# Patient Record
Sex: MALE | Race: WHITE | NOT HISPANIC OR LATINO | Employment: OTHER | URBAN - METROPOLITAN AREA
[De-identification: names, ages, dates, MRNs, and addresses within clinical notes are randomized per-mention and may not be internally consistent; named-entity substitution may affect disease eponyms.]

---

## 2019-06-22 ENCOUNTER — HOSPITAL ENCOUNTER (OUTPATIENT)
Dept: RADIOLOGY | Facility: MEDICAL CENTER | Age: 74
End: 2019-06-22
Attending: ORTHOPAEDIC SURGERY | Admitting: ORTHOPAEDIC SURGERY
Payer: MEDICARE

## 2019-06-22 DIAGNOSIS — R20.0 NUMBNESS: ICD-10-CM

## 2019-06-22 PROCEDURE — 72141 MRI NECK SPINE W/O DYE: CPT

## 2019-07-17 ENCOUNTER — NON-PROVIDER VISIT (OUTPATIENT)
Dept: NEUROLOGY | Facility: MEDICAL CENTER | Age: 74
End: 2019-07-17
Payer: MEDICARE

## 2019-07-17 ENCOUNTER — OFFICE VISIT (OUTPATIENT)
Dept: NEUROLOGY | Facility: MEDICAL CENTER | Age: 74
End: 2019-07-17
Payer: MEDICARE

## 2019-07-17 VITALS
HEIGHT: 69 IN | BODY MASS INDEX: 24.59 KG/M2 | TEMPERATURE: 97.7 F | RESPIRATION RATE: 18 BRPM | OXYGEN SATURATION: 94 % | WEIGHT: 166 LBS | DIASTOLIC BLOOD PRESSURE: 80 MMHG | HEART RATE: 68 BPM | SYSTOLIC BLOOD PRESSURE: 128 MMHG

## 2019-07-17 DIAGNOSIS — S54.01XA DAMAGE TO RIGHT ULNAR NERVE, INITIAL ENCOUNTER: ICD-10-CM

## 2019-07-17 DIAGNOSIS — G61.81 CIDP (CHRONIC INFLAMMATORY DEMYELINATING POLYNEUROPATHY) (HCC): ICD-10-CM

## 2019-07-17 DIAGNOSIS — R20.2 PARESTHESIA: ICD-10-CM

## 2019-07-17 DIAGNOSIS — R73.09 ALTERED GLUCOSE METABOLISM: ICD-10-CM

## 2019-07-17 DIAGNOSIS — G62.9 PERIPHERAL POLYNEUROPATHY: ICD-10-CM

## 2019-07-17 DIAGNOSIS — G56.01 CARPAL TUNNEL SYNDROME OF RIGHT WRIST: ICD-10-CM

## 2019-07-17 DIAGNOSIS — R53.1 WEAKNESS: ICD-10-CM

## 2019-07-17 DIAGNOSIS — E55.9 VITAMIN D DEFICIENCY: ICD-10-CM

## 2019-07-17 PROCEDURE — 99205 OFFICE O/P NEW HI 60 MIN: CPT | Performed by: PSYCHIATRY & NEUROLOGY

## 2019-07-17 PROCEDURE — 95910 NRV CNDJ TEST 7-8 STUDIES: CPT | Performed by: PSYCHIATRY & NEUROLOGY

## 2019-07-17 PROCEDURE — 95886 MUSC TEST DONE W/N TEST COMP: CPT | Performed by: PSYCHIATRY & NEUROLOGY

## 2019-07-17 ASSESSMENT — PATIENT HEALTH QUESTIONNAIRE - PHQ9: CLINICAL INTERPRETATION OF PHQ2 SCORE: 0

## 2019-07-17 NOTE — NON-PROVIDER
NEUROLOGY INITIAL NOTE    Referring Physician  Dr. Chopra  Chief Complaint:  Peripheral Neuropathy    History of Present Illness:   Rene was referred to us by orthopedics for an EMG due to new onset peripheral neuropathy in all extremities for the past 6 weeks. Patient received EMG today for evaluation which was abnormal. Patient was seen as a result.  The patient initially was diagnosed with right carpal tunnel in 2008 and was treated with carpal tunnel release with minimal improvement. The patient was again seen in 2013 for progressive atrophy of the right hand and was diagnosed with carpal tunnel and ulnar nerve compression. The patient underwent a [place] release, right cubital tunnel release and right carpal tunnel release. Patient reported minor improvement in symptoms but is still experiencing atrophy, pain, paraesthesia and loss of motor function with the right hand.   6 weeks ago he began to experience tingling in his left hand, a feeling of cold in his feet bilaterally and fatigue that was episodic in nature. The patient reports each episode would last approximately 2-3 hours per day and was random, without aggravation. Nothing made the symptoms worse or better. Patient denies pain, spasticity, falls, ataxia or diminished strength with the episodes. Patient denies any associated back or neck pain.   [facility] as pre-diabetic in 06/2017 which was treated with diet and exercise. Patient reports good compliance with diet / exercise. Patient received a B12 shot during his visit but was told his cholesterol and the rest of his routine labs were normal. Patient has not followed with primary care since. Patient has no history of stroke, heart attack, cancer, or vascular disease. Patient has never been exposed to toxins, diagnosed with Lyme disease or Syphilis.       Medical History:  Median and Ulnar nerve compression  Scaphoid fracture ~35 years ago    Family  History:  Sister with Type 2 diabetes, but reports she has unhealthy eating practices. Dad  of MI - reported poor diet as well    Social History:  Tobacco use: None  Alcohol use: None    ROS:  Constitutional: Denies fevers, Denies weight changes   Eyes: Denies changes in vision, no eye pain   Ears/Nose/Throat/Mouth: Denies nasal congestion or sore throat   Cardiovascular: Denies chest pain or palpitations   Respiratory: Denies SOB.   Gastrointestinal/Hepatic: Denies abdominal pain, nausea, vomiting, diarrhea, constipation or GI bleeding   Genitourinary: Denies bladder dysfunction, dysuria or frequency   Musculoskeletal/Rheum: Denies joint pain and swelling   Skin/Breast: Denies rash, denies breast lumps or discharge   Neurological: Denies headache, confusion, memory loss or focal weakness/parasthesias   Psychiatric: denies mood disorder   Endocrine: denies hx of diabetes or thyroid dysfunction   Heme/Oncology/Lymph Nodes: Denies enlarged lymph nodes, denies brusing or known bleeding disorder   Allergic/Immunologic: Denies hx of allergies   All other systems were reviewed and are negative (AMA/CMS criteria)       Neurological Exam:  Orientation to time, place, and person were normal  Recent and remote memory were normal  Attention span and concentration were normal  Language (naming, repetition, spontaneous speech) was normal  Fund of knowledge was normal    Cranial nerves were normal    Motor: (tone, bulk and strength): Low bulk, tone and strength in right thenar, hypothenar and intrinsic hand muscles.    Sensation (all modalities) was normal    Reflexes were normal and symmetrical in both upper extremities.Patient has normal quadriceps reflex bilaterally. Ankle jerk reflexes were absent    Coordination (finger-to-nose, heel/knee/shin, rapid alternating movements) was normal. There was no ataxia, no tremors, and no dysmetria. Station and gait were normal. Mild swaying during romberg test         NEUROIMAGING:      EEG:     Medications:  Current Outpatient Prescriptions   Medication   • aspirin EC (ECOTRIN) 81 MG Tablet Delayed Response   • ondansetron (ZOFRAN) 4 MG TABS   • Garlic TABS     No current facility-administered medications for this visit.        There were no vitals taken for this visit.      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       EMG showed temporal dispersion and low amplitude in Median nerve, proximal to wrist bilaterally. Anterior Tibialis showed temporal dispersion and low amplitude bilaterally. Common peroneal nerve showed temporal dispersion and low amplitude bilaterally  @CMP@    Patient Active Problem List    Diagnosis Date Noted   • Lesion of ulnar nerve 10/25/2013   • Carpal tunnel syndrome 10/11/2013         Diagnosis: EMG results and episodic nature of patients symptoms strongly suggest CIDP as potential cause. Vitamin B12, E and folate deficiency as well as poorly controlled diabetes are also potential causes. Paraneoplastic, untreated infections, and toxins are also possible etiologies.   Will need confirmatory testing before making final diagnosis    Plan/Recommendations:    Patient will be sent for blood work and LP in the next 4 weeks. Will order CBC / CMP / TSH / paraneoplastic panel / Vitamin panel / glucose tolerance test to rule out other causes of peripheral neuropathy.  Counseled patient on risks associated with LP and need for test, patient agreed to receive test. Counseled patient on glucose tolerance test.  Counseled patient on potential causes of his symptoms, including vitamin deficiency, toxins, infection and auto immune, including CIDP.   Will follow up with patient in 5 weeks to discuss results of testing and potential treatment plans pending test results  Counseled patient if his symptoms severely worsen or if he experiences ataxia, decreased motor function, or severe sensory loss to go to an ER.      Patient understood and agreed to plan      Frandy Cruz MD   Epilepsy and Neurodiagnostics.   Clinical  of Neurology [facility].   Diplomate in Neurology, Epilepsy, and Electrodiagnostic Medicine.   Office: [phone]  Fax: [phone]

## 2019-07-17 NOTE — PROCEDURES
NERVE CONDUCTION STUDIES AND ELECTROMYOGRAPHY REPORT        Date of service: July 17, 2019    Referring provider: Dr. Chopra.    SUMMARY OF PATIENT'S CLINICAL HISTORY, AND RATIONALE FOR TESTING:    Mr. Lei Isaacs 73 y.o. presenting with with history of right carpal tunnel, status post release twice, history of right ulnar neuropathy, status post transposition.  Progressive weakness of the right hand.  Paresthesias in both hands and both feet for at least 2-month.  Study done for possible polyneuropathy.    Past Medical History is significant for :   Past Medical History:   Diagnosis Date   • Arrhythmia     palpitations, increased heart beat on occasion          ELECTRODIAGNOSTIC EXAMINATION:  Nerve conduction studies (NCS) and electromyography (EMG) are utilized to evaluate direct or indirect damage to the peripheral nervous system. NCS are performed to measure the nerve(s) response(s) to electrostimulation across a given nerve segment. EMG evaluates the passive and active electrical activity of the muscle(s) in question.  Muscles are innervated by specific peripheral nerves and roots. Often times, several nerves the muscle to be examined in order to determine the presence or absence of the disease process. Furthermore, nerves and muscles may need to be tested in a [id number] comparison, as well as in additional extremities, as this may be crucial in characterizing the extent of the disease process, which may be diffuse or isolated and of varying degree of severity. The extent of the neurodiagnostic exam is justified as it may help arrive to a proper diagnosis, which ultimately may contribute to better management of the patient. Therefore, the nerves to muscles examined during the study were medically necessary.    Unless otherwise noted, temperature of the extremity(s) study was monitored before and during the examination and remained between 32 and 36 degrees C for the upper extremities, and between  30 and 36 degrees C for the lower extremities.      NERVE CONDUCTION STUDIES:  Sensory nerves:   -Right median sensory nerve was examined.there was moderately decreased sensory nerve action potential amplitude.  There was moderate to severely decreased conduction velocity.  -Right ulnar sensory nerve was examined.  No response could be obtained.  -Right sural nerve was tested.  No response could be obtained.    Motor nerves:   - Bilateral Median motor nerves were examined. Recording electrodes placed at the Abductor Pollicis Brevis muscles.  Severely decreased conduction velocity in both nerves.  There was conduction block with temporal dispersion in both nerves.  In addition the right median motor nerve exhibited a moderate prolongation of distal latency, and moderate decrease in the compound motor action potential amplitude.  -Right ulnar motor nerve was examined. Recording electrode placed at the Abductor Digiti Minimi muscle.  There was a severe prolongation of the distal latency.  The compound motor action potential amplitude was severely decreased.  The conduction velocity was severely decreased.  -Right tibial nerve was examined. Recording electrode placed at the Abductor Hallucis muscle.  There was mildly prolonged distal latency, and moderately decreased conduction velocity.  There was a conduction block with temporal dispersion when the nerve was evaluated at the popliteal fossa.  -Right deep Peroneal motor nerve was examined. Recording electrode was placed at the Extensor Digitorum Brevis muscle.there was severely prolonged distal latency.  The compound motor action potential amplitude was severely decreased.    LATE RESPONSES:  F waves were assessed in the following nerves: Left median, right median, right peroneal, right tibial, right ulnar.  The responses were abnormal: The left median response was severely prolonged, the right median response was absent.  The right tibial response was severely  prolonged.  The right ulnar and right peroneal's were not present.    ELECTROMYOGRAPHY:  The study was performed the concentric needle electrode. Fibrillation and fasciculation activity is graded by convention from none (0) to continuous (4+).  Needle electrode examination was performed in the following muscles: Right low cervical paraspinals, right deltoid, right biceps, right triceps, right abductor pollicis brevis, right abductor digit he minimi, right upper lumbar paraspinals, right vastus lateralis, right tibialis anterior, right medial gastrocnemius, right extensor digitorum brevis, and right abductor hallucis muscle.  There was increased insertional activity in the right triceps, right tibialis anterior, and the right medial gastrocnemius. There was atrophy in the right abductor pollicis brevis, abductor digit he minimi, extensor digitorum brevis, abductor hallucis.  There were isolated fasciculations in the right biceps, right medial gastrocnemius, and the right abductor pollicis brevis.  There were signs of active denervation in the right tibialis anterior, and right medial gastrocnemius.  There was reduced recruitment in the right abductor pollicis brevis, right tibialis anterior, right medial gastrocnemius, right extensor digitorum brevis, and right abductor hallucis.  Chronic motor units were seen in the right abductor pollicis brevis, right abductor digit he minimi, right tibialis anterior, right medial gastrocnemius, right abductor hallucis.  Chronic repetitive discharges were seen in the right medial gastrocnemius.  The cervical and lumbar paraspinals were normal.    The patient tolerated testing well, without any complications.       Nerve Conduction Studies     Stim Site NR Onset (ms) Norm Onset (ms) O-P Amp (µV) Norm O-P Amp Site1 Site2 Delta-P (ms) Dist (cm) Angel (m/s) Norm Angel (m/s)   Right Median Anti Sensory (2nd Digit)   Wrist    2.9 <3.8 *7.6 >10 Wrist 2nd Digit 4.2 14.0 *33 >50   Right Sural  Anti Sensory (Lat Mall)  35.4°C   Calf *NR  <4.6  >3 Calf Lat Mall  14.0  >40   Right Ulnar Anti Sensory (5th Digit)   Wrist *NR  <3.2  >5 Wrist 5th Digit  14.0  >50        Stim Site NR Onset (ms) Norm Onset (ms) O-P Amp (mV) Norm O-P Amp Site1 Site2 Delta-0 (ms) Dist (cm) Angel (m/s) Norm Angel (m/s)   Left Median Motor (Abd Poll Brev)  35.4°C   Wrist    3.8 <4 8.7 >5 Elbow Wrist 10.0 25.0 *25 >50   Elbow    13.8  3.1          Right Median Motor (Abd Poll Brev)   Wrist    *4.8 <4 *3.1 >5 Elbow Wrist 17.7 26.0 *15 >50   Elbow    22.5  0.5          Right Peroneal EDB Motor (Ext Dig Brev)   Ankle    *9.0 <6 *0.4 >2.5 B Fib Ankle 8.9 36.0 40 >40   B Fib    17.9  0.4  Poplt B Fib 2.9 10.0 34    Poplt    20.8  0.5          Right Tibial Motor (Abd Valdez Brev)   Ankle    *6.5 <6 8.2 >4 Knee Ankle 11.3 41.0 *36 >40   Knee    17.8  2.2          Right Ulnar Motor (Abd Dig Min)   Wrist    *5.3 <3.1 *2.5 >7 B Elbow Wrist 10.6 23.0 *22 >50   B Elbow    15.9  2.2  A Elbow B Elbow 2.8 10.0 36    A Elbow    18.7  2.1            F Wave Studies     NR F-Lat (ms) Lat Norm (ms)   Left Median (Abd Poll Brev)  35.4°C      *39.28 <31   Right Median (Abd Poll Brev)      *32.77 <31   Right Peroneal EDB (Ext Dig Brev)   *NR  <57   Right Tibial (Abd Hallucis)      *74.74 <57   Right Ulnar (Abd Dig Min)      *32.94 <32                                      Electromyography     Side Muscle Nerve Root Ins Act Fibs Psw Amp Dur Poly Recrt Int Pat Comment   Right Deltoid Axillary C5-6 Nml Nml Nml Nml Nml 0 Nml Nml    Right Biceps Musculocut C5-6 Nml Nml Nml Nml Nml 0 Nml Nml fasciculation   Right Triceps Radial C6-7-8 *Incr Nml Nml Nml Nml 0 Nml Nml    Right Abd Poll Brev Median C8-T1 Nml Nml Nml *Incr Nml 0 *Reduced Nml fasciculation, atrophy   Right Abd Dig Min Ulnar C8-T1 Nml Nml Nml *Incr Nml 0 Nml Nml atrophy   Right VastusLat Femoral L2-4 Nml Nml Nml Nml Nml 0 Nml Nml    Right AntTibialis Dp Br Fibular L4-5 Nml *1+ Nml *Incr Nml 0 *Reduced Nml     Right Gastroc Tibial S1-2 Nml *1+ Nml *Incr Nml 0 *Reduced Nml fasciculation, CRD   Right Ext Dig Brev Dp Br Fibular L5, S1 Nml Nml Nml Nml Nml 0 *Reduced Nml atrophy   Right AbdHallucis MedPlantar S1-2 Nml Nml Nml *Incr Nml 0 *Reduced Nml atrophy   Right Lumbo Parasp Up Rami L1-2 Nml Nml Nml         Right Cervical Parasp Low Rami C7-8 Nml Nml Nml                 DIAGNOSTIC INTERPRETATION:   Extensive electrodiagnostic studies were performed to the right upper and right lower extremities, and to a limited degree, for comparison purposes, to the left upper extremity. The studies were abnormal.       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     The patient has a history of right Carpal Tunnel Syndrome and right Ulnar Neuropathy at the elbow, however in the presence of the widespread abnormalities found on today's testing, we could not make statements about these conditions at this time.       Frandy Cruz MD  Medical Director, Epilepsy and Neurodiagnostics.   Clinical  of Neurology [facility].   Diplomate in Neurology, Epilepsy, and Electrodiagnostic Medicine.   Office: [phone]  Fax: [phone]

## 2019-07-18 NOTE — PROGRESS NOTES
Chief Complaint   Patient presents with   • New Patient     EMG results       Problem List Items Addressed This Visit     Carpal tunnel syndrome    Relevant Orders    ANGIOTENSIN I CONVERTING ENZYME    CRP HIGH SENSITIVE    FOLATE    CRYOGLOBULIN QL SERUM RFLX    HEAVY METALS BLOOD    GLUCOSE TOLERANCE 2 HOUR    HEMOGLOBIN A1C    PARANEOPLASTIC SYNDROME AB PANEL    RPR (SYPHILIS)    SJOGREN'S AB, ANTI-SS-A/-SS-B    SPEP W/REFLEX TO JOAQUÍN, A, G, M    TSH    VITAMIN B1    VITAMIN B12    VITAMIN B6    VITAMIN D,25 HYDROXY    VITAMIN E    WESTERGREN SED RATE    ZINC SERUM    PT AND PTT    Prothrombin Time    CBC WITH DIFFERENTIAL    Comp Metabolic Panel      Other Visit Diagnoses     CIDP (chronic inflammatory demyelinating polyneuropathy) ([facility])        Relevant Orders    ANGIOTENSIN I CONVERTING ENZYME    CRP HIGH SENSITIVE    FOLATE    CRYOGLOBULIN QL SERUM RFLX    HEAVY METALS BLOOD    GLUCOSE TOLERANCE 2 HOUR    HEMOGLOBIN A1C    PARANEOPLASTIC SYNDROME AB PANEL    RPR (SYPHILIS)    SJOGREN'S AB, ANTI-SS-A/-SS-B    SPEP W/REFLEX TO JOAQUÍN, A, G, M    TSH    VITAMIN B1    VITAMIN B12    VITAMIN B6    VITAMIN D,25 HYDROXY    VITAMIN E    WESTERGREN SED RATE    ZINC SERUM    PT AND PTT    Prothrombin Time    DX-LUMBAR PUNCTURE FOR DIAGNOSIS    CBC WITH DIFFERENTIAL    Comp Metabolic Panel    CSF CELL COUNT    CSF PROTEIN    TREPONEMA PALLIDUM ANTIBODY, IGG BY IFA (CSF)    ACE, CSF    IGG, CSF INDEX    LYME (B. BURGDORFERI) PCR    Damage to right ulnar nerve, initial encounter        Relevant Orders    ANGIOTENSIN I CONVERTING ENZYME    CRP HIGH SENSITIVE    FOLATE    CRYOGLOBULIN QL SERUM RFLX    HEAVY METALS BLOOD    GLUCOSE TOLERANCE 2 HOUR    HEMOGLOBIN A1C    PARANEOPLASTIC SYNDROME AB PANEL    RPR (SYPHILIS)    SJOGREN'S AB, ANTI-SS-A/-SS-B    SPEP W/REFLEX TO JOAQUÍN, A, G, M    TSH    VITAMIN B1    VITAMIN B12    VITAMIN B6    VITAMIN D,25 HYDROXY    VITAMIN E    WESTERGREN SED RATE    ZINC SERUM    PT AND PTT     Prothrombin Time    CBC WITH DIFFERENTIAL    Comp Metabolic Panel    Altered glucose metabolism        Relevant Orders    ANGIOTENSIN I CONVERTING ENZYME    CRP HIGH SENSITIVE    FOLATE    CRYOGLOBULIN QL SERUM RFLX    HEAVY METALS BLOOD    GLUCOSE TOLERANCE 2 HOUR    HEMOGLOBIN A1C    PARANEOPLASTIC SYNDROME AB PANEL    RPR (SYPHILIS)    SJOGREN'S AB, ANTI-SS-A/-SS-B    SPEP W/REFLEX TO JOAQUÍN, A, G, M    TSH    VITAMIN B1    VITAMIN B12    VITAMIN B6    VITAMIN D,25 HYDROXY    VITAMIN E    WESTERGREN SED RATE    ZINC SERUM    PT AND PTT    Prothrombin Time    CBC WITH DIFFERENTIAL    Comp Metabolic Panel    Vitamin D deficiency        Relevant Orders    ANGIOTENSIN I CONVERTING ENZYME    CRP HIGH SENSITIVE    FOLATE    CRYOGLOBULIN QL SERUM RFLX    HEAVY METALS BLOOD    GLUCOSE TOLERANCE 2 HOUR    HEMOGLOBIN A1C    PARANEOPLASTIC SYNDROME AB PANEL    RPR (SYPHILIS)    SJOGREN'S AB, ANTI-SS-A/-SS-B    SPEP W/REFLEX TO JOAQUÍN, A, G, M    TSH    VITAMIN B1    VITAMIN B12    VITAMIN B6    VITAMIN D,25 HYDROXY    VITAMIN E    WESTERGREN SED RATE    ZINC SERUM    PT AND PTT    Prothrombin Time    CBC WITH DIFFERENTIAL    Comp Metabolic Panel    Weakness        Relevant Orders    Prothrombin Time    DX-LUMBAR PUNCTURE FOR DIAGNOSIS    CBC WITH DIFFERENTIAL    Comp Metabolic Panel    LYME (B. BURGDORFERI) PCR    Paresthesia              History of present illness:  Lei Isaacs 73 y.o. male presents today referred to us by orthopedics for an EMG due to new onset peripheral neuropathy in all extremities for the past 6-8 weeks. Patient underwent EMG today for evaluation which was abnormal, and the patient needed to be seen relatively soon, due to the abnormality of the results.  The patient initially was diagnosed with right carpal tunnel in 2008 and was treated with carpal tunnel release with minimal improvement. The patient was again seen in 2013 for progressive atrophy of the right hand and was diagnosed with carpal  tunnel and ulnar nerve compression. The patient underwent a [place] release, right cubital tunnel release and right carpal tunnel release. Patient reported minor improvement in symptoms but is still experiencing atrophy, pain, paraesthesia and loss of motor function with the right hand.   6 to 8 weeks ago he began to experience tingling in both hands, a feeling of cold in his feet bilaterally and fatigue that was episodic in nature. The patient reports each episode would last approximately 2-3 hours per day and was random, without aggravation. Nothing made the symptoms worse or better. Patient denies pain, spasticity, falls, ataxia or diminished strength with the episodes. Patient denies any associated back or neck pain.   [facility] as pre[facility]diabetic in 06/2017 which was treated with diet and exercise. Patient reports good compliance with diet / exercise. Patient received a B12 shot during his visit but was told his cholesterol and the rest of his routine labs were normal. Patient has not followed with primary care since. Patient has no history of stroke, heart attack, cancer, or vascular disease. Patient has never been exposed to toxins, diagnosed with Lyme disease or Syphilis.     About 2 years ago, he had shingles in the thoracic region on the left, and developed postherpetic neuralgia.  Other than that, he has not had any recent surgeries, vaccinations and or infections in the last 2 years, that he remembers.    Past medical history:   Past Medical History:   Diagnosis Date   • Arrhythmia     palpitations, increased heart beat on occasion       Past surgical history:   Past Surgical History:   Procedure Laterality Date   • CARPAL TUNNEL RELEASE  10/25/2013    Performed by Lauro Rossi M.D. at SUR[facility]ERY [facility] DAY [facility]   • NERVE ULNAR TRANSFER  10/25/2013    Performed by Lauro Rossi M.D. at [facility]ERY SAME DAY [facility]   • EPICONDYLAR STRIPPING   10/25/2013    Performed by Lauro Rossi M.D. at [facility]ERY SAME DAY [facility]   • CARPAL TUNNEL RELEASE      right       Family history:   No family history on file.    Social history:   Social History     Social History   • Marital status: Single     Spouse name: N/A   • Number of children: N/A   • Years of education: N/A     Occupational History   • Not on file.     Social History Main Topics   • Smoking status: Never Smoker   • Smokeless tobacco: Never Used   • Alcohol use Yes      Comment: 1 beer a day occassionally   • Drug use: No   • Sexual activity: Not on file     Other Topics Concern   • Not on file     Social History Narrative   • No narrative on file       Current medications:   Current Outpatient Prescriptions   Medication   • aspirin EC (ECOTRIN) 81 MG Tablet Delayed Response   • ondansetron (ZOFRAN) 4 MG TABS   • Garlic TABS     No current facility-administered medications for this visit.        Medication Allergy:  No Known Allergies    Review of systems:   Constitutional: denies fever, night sweats, weight loss.   Eyes: denies acute vision change, eye pain or secretion.   Ears, Nose, Mouth, Throat: denies nasal secretion, nasal bleeding, difficulty swallowing, hearing loss, tinnitus, vertigo, ear pain, acute dental problems, oral ulcers or lesions.   Endocrine: denies recent weight changes, heat or cold intolerance, polyuria, polydypsia, polyphagia,abnormal hair growth.  Cardiovascular: denies new onset of chest pain, palpitations, syncope, or dyspnea of exertion.  Pulmonary: denies shortness of breath, new onset of cough, hemoptysis, wheezing, chest pain or flu-like symptoms.   GI: denies nausea, vomiting, diarrhea, GI bleeding, change in appetite, abdominal pain, and change in bowel habits.  : denies dysuria, urinary incontinence, hematuria.  Heme/oncology: denies history of easy bruising or bleeding. No history of cancer, DVTor PE.  Allergy/immunology: denies hives/urticaria, or  "itching.   Dermatologic: denies new rash, or new skin lesions.  Musculoskeletal:denies joint swelling or pain, muscle pain, neck and back pain. Neurologic: denies headaches, acute visual changes, facial droopiness, anesthesia, ataxia, change in speech or language, memory loss, abnormal movements, seizures, loss of consciousness, or episodes of confusion.   Psychiatric: denies symptoms of depression, anxiety, hallucinations, mood swings or changes, suicidal or homicidal thoughts.     Physical examination:   Vitals:    07/17/19 1518   BP: 128/80   BP Location: Right arm   Patient Position: Sitting   BP Cuff Size: Adult   Pulse: 68   Resp: 18   Temp: 36.5 °C (97.7 °F)   TempSrc: Temporal   SpO2: 94%   Weight: 75.3 kg (166 lb)   Height: 1.753 m (5' 9\")     General: Patient in no acute distress, pleasant and cooperative.  HEENT: Normocephalic, no signs of acute trauma.   Neck: supple, no meningeal signs or carotid bruits. There is normal range of motion. No tenderness on exam.   Chest: clear to auscultation. No cough.   CV: RRR, no murmurs.   Skin: no signs of acute rashes or trauma.   Musculoskeletal: joints exhibit full range of motion, without any pain to palpation. There are no signs of joint or muscle swelling. There is no tenderness to deep palpation of muscles.   Psychiatric: No hallucinatory behavior. Denies symptoms of depression or suicidal ideation. Mood and affect appear normal on exam.     NEUROLOGICAL EXAM:   Mental status, orientation: Awake, alert and fully oriented.   Speech and language: speech is clear and fluent. The patient is able to name, repeat and comprehend.   Memory: There is intact recollection of recent and remote events.   Cranial nerve exam: Pupils are 3-4 mm bilaterally and equally reactive to light and accommodation. Visual fields are intact by confrontation. There is no nystagmus on primary or secondary gaze. Intact full EOM in all directions of gaze. Face appears symmetric. Sensation in " the face is intact to light touch. Uvula is midline. Palate elevates symmetrically. Tongue is midline and without any signs of tongue biting or fasciculations. Sternocleidomastoid muscles exhibit is normal strength bilaterally. Shoulder shrug is intact bilaterally.   Motor exam: Strength is 5/5 in all extremities, there is atrophy in the thenar and hyperthenar regions of the right hand, with diminished strength to 4- out of 5. Tone is normal. No abnormal movements were seen on exam.   Sensory exam reveals normal sense of light touch in all extremities.   Deep tendon reflexes:  2+ throughout, with absent ankle jerks. Plantar responses are flexor. There is no clonus.   Coordination: shows a normal finger-nose-finger. Normal rapidly alternating movements.   Gait: The patient was able to get up from seated position on first attempt without requiring assistance. Found to be steady when walking. Movements were fluid with normal arm swing. The patient was able to turn without difficulties or tendency to fall. Romberg examination shows mild swaying in all directions.      ANCILLARY DATA REVIEWED:     Lab Data Review:  Reviewed in chart.      Records reviewed:   Chart reviewed.      Imaging:   MRI cervical spine without contrast, 6/22/2019:  Multilevel degenerative changes of the cervical spine as described above.  (I personally reviewed images with the patient, multilevel degeneration, no cord compression or signal abnormality).      Nerve conduction study and EMG, 7/17/2019:  DIAGNOSTIC INTERPRETATION:   Extensive electrodiagnostic studies were performed to the right upper and right lower extremities, and to a limited degree, for comparison purposes, to the left upper extremity. The studies were abnormal.    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            ASSESSMENT AND PLAN:    1. CIDP (chronic inflammatory demyelinating polyneuropathy) (HCC)  - ANGIOTENSIN I CONVERTING ENZYME; Future  - CRP HIGH SENSITIVE  - FOLATE; Future  - CRYOGLOBULIN QL SERUM RFLX; Future  - HEAVY METALS BLOOD; Future  - GLUCOSE TOLERANCE 2 HOUR; Future  - HEMOGLOBIN A1C; Future  - PARANEOPLASTIC SYNDROME AB PANEL; Future  - RPR (SYPHILIS); Future  - SJOGREN'S AB, ANTI-SS-A/-SS-B  - SPEP W/REFLEX TO JOAQUÍN, A, G, M; Future  - TSH; Future  - VITAMIN B1; Future  - VITAMIN B12; Future  - VITAMIN B6; Future  - VITAMIN D,25 HYDROXY; Future  - VITAMIN E; Future  - WESTERGREN SED RATE; Future  - ZINC SERUM; Future  - PT AND PTT  - Prothrombin Time; Future  - DX-LUMBAR PUNCTURE FOR DIAGNOSIS; Future  - CBC WITH DIFFERENTIAL; Future  - Comp Metabolic Panel; Future  - CSF CELL COUNT; Future  - CSF PROTEIN; Future  - TREPONEMA PALLIDUM ANTIBODY, IGG BY IFA (CSF); Future  - ACE, CSF; Future  - IGG, CSF INDEX; Future  - LYME (B. BURGDORFERI) PCR    2. Carpal tunnel syndrome of right wrist  - ANGIOTENSIN I CONVERTING ENZYME; Future  - CRP HIGH SENSITIVE  - FOLATE; Future  - CRYOGLOBULIN QL SERUM RFLX; Future  - HEAVY METALS BLOOD; Future  - GLUCOSE TOLERANCE 2 HOUR; Future  - HEMOGLOBIN A1C; Future  - PARANEOPLASTIC SYNDROME AB PANEL; Future  - RPR (SYPHILIS); Future  - SJOGREN'S AB, ANTI-SS-A/-SS-B  - SPEP W/REFLEX TO JOAQUÍN, A, G, M; Future  - TSH; Future  - VITAMIN B1; Future  - VITAMIN B12; Future  - VITAMIN B6; Future  - VITAMIN D,25 HYDROXY; Future  - VITAMIN E; Future  - WESTERGREN SED RATE; Future  - ZINC SERUM; Future  - PT AND PTT  - Prothrombin Time; Future  - CBC WITH DIFFERENTIAL; Future  - Comp Metabolic Panel; Future    3. Damage to right ulnar  nerve, initial encounter  - ANGIOTENSIN I CONVERTING ENZYME; Future  - CRP HIGH SENSITIVE  - FOLATE; Future  - CRYOGLOBULIN QL SERUM RFLX; Future  - HEAVY METALS BLOOD; Future  - GLUCOSE TOLERANCE 2 HOUR; Future  - HEMOGLOBIN A1C; Future  - PARANEOPLASTIC SYNDROME AB PANEL; Future  - RPR (SYPHILIS); Future  - SJOGREN'S AB, ANTI-SS-A/-SS-B  - SPEP W/REFLEX TO JOAQUÍN, A, G, M; Future  - TSH; Future  - VITAMIN B1; Future  - VITAMIN B12; Future  - VITAMIN B6; Future  - VITAMIN D,25 HYDROXY; Future  - VITAMIN E; Future  - WESTERGREN SED RATE; Future  - ZINC SERUM; Future  - PT AND PTT  - Prothrombin Time; Future  - CBC WITH DIFFERENTIAL; Future  - Comp Metabolic Panel; Future    4. Altered glucose metabolism  - ANGIOTENSIN I CONVERTING ENZYME; Future  - CRP HIGH SENSITIVE  - FOLATE; Future  - CRYOGLOBULIN QL SERUM RFLX; Future  - HEAVY METALS BLOOD; Future  - GLUCOSE TOLERANCE 2 HOUR; Future  - HEMOGLOBIN A1C; Future  - PARANEOPLASTIC SYNDROME AB PANEL; Future  - RPR (SYPHILIS); Future  - SJOGREN'S AB, ANTI-SS-A/-SS-B  - SPEP W/REFLEX TO JOAQUÍN, A, G, M; Future  - TSH; Future  - VITAMIN B1; Future  - VITAMIN B12; Future  - VITAMIN B6; Future  - VITAMIN D,25 HYDROXY; Future  - VITAMIN E; Future  - WESTERGREN SED RATE; Future  - ZINC SERUM; Future  - PT AND PTT  - Prothrombin Time; Future  - CBC WITH DIFFERENTIAL; Future  - Comp Metabolic Panel; Future    5. Vitamin D deficiency  - ANGIOTENSIN I CONVERTING ENZYME; Future  - CRP HIGH SENSITIVE  - FOLATE; Future  - CRYOGLOBULIN QL SERUM RFLX; Future  - HEAVY METALS BLOOD; Future  - GLUCOSE TOLERANCE 2 HOUR; Future  - HEMOGLOBIN A1C; Future  - PARANEOPLASTIC SYNDROME AB PANEL; Future  - RPR (SYPHILIS); Future  - SJOGREN'S AB, ANTI-SS-A/-SS-B  - SPEP W/REFLEX TO JOAQUÍN, A, G, M; Future  - TSH; Future  - VITAMIN B1; Future  - VITAMIN B12; Future  - VITAMIN B6; Future  - VITAMIN D,25 HYDROXY; Future  - VITAMIN E; Future  - WESTERGREN SED RATE; Future  - ZINC SERUM; Future  - PT AND  PTT  - Prothrombin Time; Future  - CBC WITH DIFFERENTIAL; Future  - Comp Metabolic Panel; Future    6. Weakness  - Prothrombin Time; Future  - DX-LUMBAR PUNCTURE FOR DIAGNOSIS; Future  - CBC WITH DIFFERENTIAL; Future  - Comp Metabolic Panel; Future  - LYME (B. BURGDORFERI) PCR    7. Paresthesia      CLINICAL DISCUSSION:   The patient presented in the EMG lab for evaluation of possible peripheral polyneuropathy.  He has a history of right carpal tunnel syndrome, status post release twice, with significant disability which is residual and chronic, with atrophy of the thenar region.  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  He has also atrophy and weakness with the hyperthenar region as well.  The patient has had paresthesias for about 2-month or may be more.  These are in all 4 extremities, in the hands and feet.  He denies any significant weakness or problems walking.  He does not recall of a single event where he was generally weak or had ascending paresthesias.  He has not received any recent vaccinations and have not had any recent surgeries.  He denies any neck or back pain.  He had an MRI of the cervical spine, which did not show any lesions or significant compression.  The patient denies a history of cancer.  Remotely, he had served in the [employer] in [place], but denies having had any issues at that time.  He denies a history of stroke, MS, or any other neurological problems.  Nerve conduction and EMG was suggestive of a widespread, moderate to severe, sensory and motor peripheral polyneuropathy of the demyelinating type.  The findings appear to be chronic in nature.  The patient will require additional work-up.  This would include extensive blood work, and lumbar puncture.  The possible complications, risks versus benefits of the lumbar puncture were discussed at length.  This included, but not limited to headaches,  infection, pain, radiculopathy, bleeding, hematoma, among others.  The patient has agreed to proceed.  We discussed the natural history of CIDP.  The patient is aware of progression and disability.  We discussed management options.  The patient decided to wait for now, he will be seen in the next 5 to 6 weeks in clinic, hopefully with results of spinal fluid and blood, so that we can make decisions in terms of his management.  His right carpal tunnel, and right ulnar neuropathy appear chronic in nature, on EMG there are no signs of active denervation to suggest active disease, it appears that he is chronic weakness on the right hand is stable.  I do not see any reason for further interventions in the right ulnar or median nerve at this point.  History of left thoracic shingles, status post postherpetic neuralgia, now resolved, about 2 years ago.      FOLLOW-UP:   Return in about 4 weeks (around 8/14/2019).          EDUCATION AND COUNSELING:  -Discussed regular exercise program and prevention of cardiovascular disease, including stroke.   -Discussed healthy lifestyle, including: healthy diet (rich in fruits, vegetables, nuts and healthy oils); proper hydration, and adequate sleep hygiene (allowing 7-8 hrs of overnight sleep).    The patient understands and agrees that due to the complexity of his/her diagnosis, results of any testing and further recommendations will typically be discussed/made during a face to face encounter in my office. The patient and/or family further understands it is their responsibility to keep proper follow up.       Frandy Cruz MD   Epilepsy and Neurodiagnostics.   Clinical  of Neurology [facility].   Diplomate in Neurology, Epilepsy, and Electrodiagnostic Medicine.   Office: [phone]  Fax: [phone]      BILLING DOCUMENTATION:        Counseling:  I spent greater than 50% time face-to-face time of a total of 65  minutes visit, in addition to any testing conducted today. Over 50% of the time of the visit today was spent on counseling and or coordination of care wtih the patient and/or family, with greater than 50% of the total discussing my  assessment and plan as stated above.

## 2019-07-19 ENCOUNTER — HOSPITAL ENCOUNTER (OUTPATIENT)
Dept: LAB | Facility: MEDICAL CENTER | Age: 74
End: 2019-07-19
Attending: PSYCHIATRY & NEUROLOGY
Payer: MEDICARE

## 2019-07-19 DIAGNOSIS — E55.9 VITAMIN D DEFICIENCY: ICD-10-CM

## 2019-07-19 DIAGNOSIS — G56.01 CARPAL TUNNEL SYNDROME OF RIGHT WRIST: ICD-10-CM

## 2019-07-19 DIAGNOSIS — S54.01XA DAMAGE TO RIGHT ULNAR NERVE, INITIAL ENCOUNTER: ICD-10-CM

## 2019-07-19 DIAGNOSIS — R53.1 WEAKNESS: ICD-10-CM

## 2019-07-19 DIAGNOSIS — R73.09 ALTERED GLUCOSE METABOLISM: ICD-10-CM

## 2019-07-19 DIAGNOSIS — G61.81 CIDP (CHRONIC INFLAMMATORY DEMYELINATING POLYNEUROPATHY) (HCC): ICD-10-CM

## 2019-07-19 LAB
25(OH)D3 SERPL-MCNC: 16 NG/ML (ref 30–100)
ALBUMIN SERPL BCP-MCNC: 3.9 G/DL (ref 3.2–4.9)
ALBUMIN/GLOB SERPL: 1.2 G/DL
ALP SERPL-CCNC: 44 U/L (ref 30–99)
ALT SERPL-CCNC: 31 U/L (ref 2–50)
ANION GAP SERPL CALC-SCNC: 9 MMOL/L (ref 0–11.9)
APTT PPP: 37.1 SEC (ref 24.7–36)
AST SERPL-CCNC: 23 U/L (ref 12–45)
BASOPHILS # BLD AUTO: 0.6 % (ref 0–1.8)
BASOPHILS # BLD: 0.04 K/UL (ref 0–0.12)
BILIRUB SERPL-MCNC: 1.5 MG/DL (ref 0.1–1.5)
BUN SERPL-MCNC: 22 MG/DL (ref 8–22)
CALCIUM SERPL-MCNC: 9.7 MG/DL (ref 8.5–10.5)
CHLORIDE SERPL-SCNC: 102 MMOL/L (ref 96–112)
CO2 SERPL-SCNC: 27 MMOL/L (ref 20–33)
CREAT SERPL-MCNC: 0.96 MG/DL (ref 0.5–1.4)
CRP SERPL HS-MCNC: 1.3 MG/L (ref 0–7.5)
EOSINOPHIL # BLD AUTO: 0.19 K/UL (ref 0–0.51)
EOSINOPHIL NFR BLD: 3 % (ref 0–6.9)
ERYTHROCYTE [DISTWIDTH] IN BLOOD BY AUTOMATED COUNT: 46.6 FL (ref 35.9–50)
ERYTHROCYTE [SEDIMENTATION RATE] IN BLOOD BY WESTERGREN METHOD: 4 MM/HOUR (ref 0–20)
EST. AVERAGE GLUCOSE BLD GHB EST-MCNC: 128 MG/DL
FOLATE SERPL-MCNC: >23.8 NG/ML
GLOBULIN SER CALC-MCNC: 3.3 G/DL (ref 1.9–3.5)
GLUCOSE 1.5H P CHAL SERPL-MCNC: 192 MG/DL (ref 65–139)
GLUCOSE 1H P CHAL SERPL-MCNC: 202 MG/DL (ref 65–199)
GLUCOSE 2H P CHAL SERPL-MCNC: 135 MG/DL (ref 65–139)
GLUCOSE 30M P CHAL SERPL-MCNC: 200 MG/DL (ref 65–199)
GLUCOSE BS SERPL-MCNC: 110 MG/DL (ref 65–99)
GLUCOSE SERPL-MCNC: 113 MG/DL (ref 65–99)
HBA1C MFR BLD: 6.1 % (ref 0–5.6)
HCT VFR BLD AUTO: 55.1 % (ref 42–52)
HGB BLD-MCNC: 17.5 G/DL (ref 14–18)
IMM GRANULOCYTES # BLD AUTO: 0.01 K/UL (ref 0–0.11)
IMM GRANULOCYTES NFR BLD AUTO: 0.2 % (ref 0–0.9)
INR PPP: 0.99 (ref 0.87–1.13)
LYMPHOCYTES # BLD AUTO: 1.29 K/UL (ref 1–4.8)
LYMPHOCYTES NFR BLD: 20.4 % (ref 22–41)
MCH RBC QN AUTO: 31.4 PG (ref 27–33)
MCHC RBC AUTO-ENTMCNC: 31.8 G/DL (ref 33.7–35.3)
MCV RBC AUTO: 98.7 FL (ref 81.4–97.8)
MONOCYTES # BLD AUTO: 0.47 K/UL (ref 0–0.85)
MONOCYTES NFR BLD AUTO: 7.4 % (ref 0–13.4)
NEUTROPHILS # BLD AUTO: 4.31 K/UL (ref 1.82–7.42)
NEUTROPHILS NFR BLD: 68.4 % (ref 44–72)
NRBC # BLD AUTO: 0 K/UL
NRBC BLD-RTO: 0 /100 WBC
PLATELET # BLD AUTO: 311 K/UL (ref 164–446)
PMV BLD AUTO: 10.3 FL (ref 9–12.9)
POTASSIUM SERPL-SCNC: 4.3 MMOL/L (ref 3.6–5.5)
PROT SERPL-MCNC: 7.2 G/DL (ref 6–8.2)
PROTHROMBIN TIME: 13.3 SEC (ref 12–14.6)
RBC # BLD AUTO: 5.58 M/UL (ref 4.7–6.1)
SODIUM SERPL-SCNC: 138 MMOL/L (ref 135–145)
TREPONEMA PALLIDUM IGG+IGM AB [PRESENCE] IN SERUM OR PLASMA BY IMMUNOASSAY: NON REACTIVE
TSH SERPL DL<=0.005 MIU/L-ACNC: 4.19 UIU/ML (ref 0.38–5.33)
VIT B12 SERPL-MCNC: 701 PG/ML (ref 211–911)
WBC # BLD AUTO: 6.3 K/UL (ref 4.8–10.8)

## 2019-07-19 PROCEDURE — 83825 ASSAY OF MERCURY: CPT

## 2019-07-19 PROCEDURE — 83520 IMMUNOASSAY QUANT NOS NONAB: CPT

## 2019-07-19 PROCEDURE — 86780 TREPONEMA PALLIDUM: CPT | Mod: GA

## 2019-07-19 PROCEDURE — 84443 ASSAY THYROID STIM HORMONE: CPT

## 2019-07-19 PROCEDURE — 80053 COMPREHEN METABOLIC PANEL: CPT

## 2019-07-19 PROCEDURE — 86141 C-REACTIVE PROTEIN HS: CPT | Mod: GA

## 2019-07-19 PROCEDURE — 82595 ASSAY OF CRYOGLOBULIN: CPT

## 2019-07-19 PROCEDURE — 82164 ANGIOTENSIN I ENZYME TEST: CPT

## 2019-07-19 PROCEDURE — 82607 VITAMIN B-12: CPT

## 2019-07-19 PROCEDURE — 86235 NUCLEAR ANTIGEN ANTIBODY: CPT | Mod: 91

## 2019-07-19 PROCEDURE — 36415 COLL VENOUS BLD VENIPUNCTURE: CPT

## 2019-07-19 PROCEDURE — 84160 ASSAY OF PROTEIN ANY SOURCE: CPT

## 2019-07-19 PROCEDURE — 84630 ASSAY OF ZINC: CPT

## 2019-07-19 PROCEDURE — 86255 FLUORESCENT ANTIBODY SCREEN: CPT

## 2019-07-19 PROCEDURE — 83655 ASSAY OF LEAD: CPT

## 2019-07-19 PROCEDURE — 82951 GLUCOSE TOLERANCE TEST (GTT): CPT

## 2019-07-19 PROCEDURE — 84425 ASSAY OF VITAMIN B-1: CPT

## 2019-07-19 PROCEDURE — 85025 COMPLETE CBC W/AUTO DIFF WBC: CPT

## 2019-07-19 PROCEDURE — 83036 HEMOGLOBIN GLYCOSYLATED A1C: CPT | Mod: GA

## 2019-07-19 PROCEDURE — 85610 PROTHROMBIN TIME: CPT | Mod: GA

## 2019-07-19 PROCEDURE — 84165 PROTEIN E-PHORESIS SERUM: CPT

## 2019-07-19 PROCEDURE — 83519 RIA NONANTIBODY: CPT | Mod: 91

## 2019-07-19 PROCEDURE — 82300 ASSAY OF CADMIUM: CPT

## 2019-07-19 PROCEDURE — 84446 ASSAY OF VITAMIN E: CPT

## 2019-07-19 PROCEDURE — 82175 ASSAY OF ARSENIC: CPT

## 2019-07-19 PROCEDURE — 85652 RBC SED RATE AUTOMATED: CPT

## 2019-07-19 PROCEDURE — 87476 LYME DIS DNA AMP PROBE: CPT

## 2019-07-19 PROCEDURE — 84207 ASSAY OF VITAMIN B-6: CPT

## 2019-07-19 PROCEDURE — 85730 THROMBOPLASTIN TIME PARTIAL: CPT | Mod: GA

## 2019-07-19 PROCEDURE — 82952 GTT-ADDED SAMPLES: CPT | Mod: 91

## 2019-07-19 PROCEDURE — 82306 VITAMIN D 25 HYDROXY: CPT

## 2019-07-19 PROCEDURE — 82746 ASSAY OF FOLIC ACID SERUM: CPT

## 2019-07-20 LAB — ACE SERPL-CCNC: 25 U/L (ref 9–67)

## 2019-07-21 DIAGNOSIS — E55.9 VITAMIN D DEFICIENCY: ICD-10-CM

## 2019-07-21 LAB
ARSENIC BLD-MCNC: <10 UG/L (ref 0–12)
B BURGDOR DNA SPEC QL NAA+PROBE: NOT DETECTED
CADMIUM BLD-MCNC: <1 UG/L (ref 0–5)
ENA SS-B IGG SER IA-ACNC: 0 AU/ML (ref 0–40)
LEAD BLDV-MCNC: <2 UG/DL (ref 0–4.9)
LYME SOURCE Q4169: NORMAL
MERCURY BLD-MCNC: <2.5 UG/L (ref 0–10)
SSA52 R0ENA AB IGG Q0420: 0 AU/ML (ref 0–40)
SSA60 R0ENA AB IGG Q0419: 0 AU/ML (ref 0–40)
ZINC SERPL-MCNC: 84.9 UG/DL (ref 60–120)

## 2019-07-21 RX ORDER — ERGOCALCIFEROL 1.25 MG/1
50000 CAPSULE ORAL
Qty: 4 CAP | Refills: 5 | Status: SHIPPED | OUTPATIENT
Start: 2019-07-21 | End: 2021-02-08

## 2019-07-22 ENCOUNTER — TELEPHONE (OUTPATIENT)
Dept: NEUROLOGY | Facility: MEDICAL CENTER | Age: 74
End: 2019-07-22

## 2019-07-22 LAB
A-TOCOPHEROL VIT E SERPL-MCNC: 14.6 MG/L (ref 5.5–18)
ALBUMIN SERPL-MCNC: 5 G/DL (ref 3.75–5.01)
ALPHA1 GLOB SERPL ELPH-MCNC: 0.32 G/DL (ref 0.19–0.46)
ALPHA2 GLOB SERPL ELPH-MCNC: 0.76 G/DL (ref 0.48–1.05)
B-GLOBULIN SERPL ELPH-MCNC: 1.04 G/DL (ref 0.48–1.1)
BETA+GAMMA TOCOPHEROL SERPL-MCNC: 0.6 MG/L (ref 0–6)
GAMMA GLOB SERPL ELPH-MCNC: 1.2 G/DL (ref 0.62–1.51)
INTERPRETATION SERPL IFE-IMP: NORMAL
MONOCLON BAND OBS SERPL: NORMAL
PATHOLOGY STUDY: NORMAL
PROT SERPL-MCNC: 8.3 G/DL (ref 6–8.3)

## 2019-07-22 NOTE — TELEPHONE ENCOUNTER
Please let pt know he is prediabetic and needs to see his pcp.   Also his vit D is low and I placed rx for weekly supplement for six months.   Thanks,   ra     I will wait for patient to return my call.

## 2019-07-23 LAB
CRYOGLOB SER QL 3D COLD INC: NORMAL
VIT B1 BLD-MCNC: 83 NMOL/L (ref 70–180)
VIT B6 SERPL-MCNC: 77.3 NMOL/L (ref 20–125)

## 2019-07-24 ENCOUNTER — HOSPITAL ENCOUNTER (OUTPATIENT)
Dept: RADIOLOGY | Facility: MEDICAL CENTER | Age: 74
End: 2019-07-24
Attending: PSYCHIATRY & NEUROLOGY
Payer: MEDICARE

## 2019-07-25 ENCOUNTER — TELEPHONE (OUTPATIENT)
Dept: NEUROLOGY | Facility: MEDICAL CENTER | Age: 74
End: 2019-07-25

## 2019-07-25 NOTE — TELEPHONE ENCOUNTER
Per patient I called the vit d rx to [facility] in [place] on [address]. pharmacist Domenica verbally understood.

## 2019-08-03 LAB — TEST NAME 95000: NORMAL

## 2019-08-05 ENCOUNTER — HOSPITAL ENCOUNTER (OUTPATIENT)
Facility: MEDICAL CENTER | Age: 74
End: 2019-08-05
Attending: PSYCHIATRY & NEUROLOGY
Payer: MEDICARE

## 2019-08-05 ENCOUNTER — HOSPITAL ENCOUNTER (OUTPATIENT)
Dept: RADIOLOGY | Facility: MEDICAL CENTER | Age: 74
End: 2019-08-05
Attending: PSYCHIATRY & NEUROLOGY
Payer: MEDICARE

## 2019-08-05 DIAGNOSIS — G61.81 CIDP (CHRONIC INFLAMMATORY DEMYELINATING POLYNEUROPATHY) (HCC): ICD-10-CM

## 2019-08-05 DIAGNOSIS — R53.1 WEAKNESS: ICD-10-CM

## 2019-08-05 LAB
BURR CELLS/RBC NFR CSF MANUAL: 0 %
CLARITY CSF: CLEAR
COLOR CSF: COLORLESS
COLOR SPUN CSF: COLORLESS
LYMPHOCYTES NFR CSF: 73 %
MONONUC CELLS NFR CSF: 27 %
PROT CSF-MCNC: 58 MG/DL (ref 15–45)
RBC # CSF: <1 CELLS/UL
SPECIMEN VOL CSF: 15 ML
TUBE # CSF: 3
TUBE # CSF: 3
WBC # CSF: 1 CELLS/UL (ref 0–10)

## 2019-08-05 PROCEDURE — 86141 C-REACTIVE PROTEIN HS: CPT

## 2019-08-05 PROCEDURE — 82040 ASSAY OF SERUM ALBUMIN: CPT

## 2019-08-05 PROCEDURE — 82042 OTHER SOURCE ALBUMIN QUAN EA: CPT

## 2019-08-05 PROCEDURE — 89051 BODY FLUID CELL COUNT: CPT

## 2019-08-05 PROCEDURE — 84157 ASSAY OF PROTEIN OTHER: CPT

## 2019-08-05 PROCEDURE — 86235 NUCLEAR ANTIGEN ANTIBODY: CPT | Mod: 91

## 2019-08-05 PROCEDURE — 82164 ANGIOTENSIN I ENZYME TEST: CPT

## 2019-08-05 PROCEDURE — 87476 LYME DIS DNA AMP PROBE: CPT

## 2019-08-05 PROCEDURE — 82784 ASSAY IGA/IGD/IGG/IGM EACH: CPT

## 2019-08-05 PROCEDURE — 62270 DX LMBR SPI PNXR: CPT

## 2019-08-06 ENCOUNTER — TELEPHONE (OUTPATIENT)
Dept: NEUROLOGY | Facility: MEDICAL CENTER | Age: 74
End: 2019-08-06

## 2019-08-06 NOTE — TELEPHONE ENCOUNTER
The lab called stated all the dx for crp high sensitive do not work for that specific lab, they need another dx code for it. I have a list that they dropped off with a list of codes but not sure which one to put. Please advise.

## 2019-08-07 LAB
ACE CSF-CCNC: 1.7 U/L (ref 0–2.5)
ALB CSF/SERPL: 8.6 RATIO (ref 0–9)
ALBUMIN CSF-MCNC: 32 MG/DL (ref 0–35)
ALBUMIN SERPL-MCNC: 3740 MG/DL (ref 3500–5200)
ENA SS-B IGG SER IA-ACNC: 0 AU/ML (ref 0–40)
IGG CSF-MCNC: 3.8 MG/DL (ref 0–6)
IGG SERPL-MCNC: 898 MG/DL (ref 768–1632)
IGG SYNTH RATE SER+CSF CALC-MRATE: <0 MG/D
IGG/ALB CLEAR SER+CSF-RTO: 0.49 RATIO (ref 0.28–0.66)
IGG/ALB CSF: 0.12 RATIO (ref 0.09–0.25)
SSA52 R0ENA AB IGG Q0420: 0 AU/ML (ref 0–40)
SSA60 R0ENA AB IGG Q0419: 0 AU/ML (ref 0–40)

## 2019-08-08 ENCOUNTER — TELEPHONE (OUTPATIENT)
Dept: NEUROLOGY | Facility: MEDICAL CENTER | Age: 74
End: 2019-08-08

## 2019-08-08 NOTE — TELEPHONE ENCOUNTER
I called Abbe in [facility] lab and gave her the correct icd 10 code for the crp high sensitive lab test it is E88.89. She verbally understood.

## 2019-08-09 LAB
B BURGDOR DNA SPEC QL NAA+PROBE: NOT DETECTED
LYME SOURCE Q4169: NORMAL

## 2019-08-14 LAB — CRP SERPL HS-MCNC: 0.7 MG/L (ref 0–7.5)

## 2019-08-22 ENCOUNTER — OFFICE VISIT (OUTPATIENT)
Dept: NEUROLOGY | Facility: MEDICAL CENTER | Age: 74
End: 2019-08-22
Payer: MEDICARE

## 2019-08-22 VITALS
DIASTOLIC BLOOD PRESSURE: 58 MMHG | BODY MASS INDEX: 24.73 KG/M2 | TEMPERATURE: 97.6 F | SYSTOLIC BLOOD PRESSURE: 146 MMHG | HEART RATE: 68 BPM | HEIGHT: 69 IN | WEIGHT: 167 LBS | RESPIRATION RATE: 16 BRPM | OXYGEN SATURATION: 98 %

## 2019-08-22 DIAGNOSIS — S54.01XA NEURAPRAXIA OF RIGHT ULNAR NERVE, INITIAL ENCOUNTER: ICD-10-CM

## 2019-08-22 DIAGNOSIS — Z13.31 SCREENING FOR DEPRESSION: ICD-10-CM

## 2019-08-22 DIAGNOSIS — G61.81 CIDP (CHRONIC INFLAMMATORY DEMYELINATING POLYNEUROPATHY) (HCC): ICD-10-CM

## 2019-08-22 DIAGNOSIS — Z00.00 HEALTH CARE MAINTENANCE: ICD-10-CM

## 2019-08-22 DIAGNOSIS — R73.09 ALTERED GLUCOSE METABOLISM: ICD-10-CM

## 2019-08-22 DIAGNOSIS — E55.9 VITAMIN D DEFICIENCY: ICD-10-CM

## 2019-08-22 PROCEDURE — 99215 OFFICE O/P EST HI 40 MIN: CPT | Performed by: PSYCHIATRY & NEUROLOGY

## 2019-08-22 RX ORDER — CYANOCOBALAMIN (VITAMIN B-12) 1000 MCG
TABLET ORAL
COMMUNITY
End: 2021-02-08

## 2019-08-22 NOTE — PROGRESS NOTES
Chief Complaint   Patient presents with   • Follow-Up     CIDP (chronic inflammatory demyelinating polyneuropathy)        Problem List Items Addressed This Visit     None      Visit Diagnoses     CIDP (chronic inflammatory demyelinating polyneuropathy) (HCC)        Vitamin D deficiency        Screening for depression        Neurapraxia of right ulnar nerve, initial encounter        Altered glucose metabolism        Health care maintenance              Interim history:  Lei Isaacs returns in follow-up.  He underwent extensive blood work, and spinal tap.  The patient did not have any complications from the spinal tap.  He feels improved.  The episodes of numbness and weakness that he had a few month ago states that it is gone, he only has some mild residual numbness in his toes, which is chronic in nature.  He denies any trouble swallowing, changes in vision, weakness at this point or ataxia.  He denies any falls, states he is active and able to walk and even exercise on an elliptical machine.  He has been told in the past that he is been prediabetic.  He denies drinking alcohol often.  He denies a history of liver disease or hepatitis.  There is no history of cancer.  His mood is good, denies any symptoms of depression, suicidal or homicidal ideation.      Past medical history:   Past Medical History:   Diagnosis Date   • Arrhythmia     palpitations, increased heart beat on occasion       Past surgical history:   Past Surgical History:   Procedure Laterality Date   • CARPAL TUNNEL RELEASE  10/25/2013    Performed by Lauro Rossi M.D. at SUR[facility]ERY SAME DAY [facility]   • NERVE ULNAR TRANSFER  10/25/2013    Performed by Lauro Rossi M.D. at SURGERY SAME DAY [facility]   • EPICONDYLAR STRIPPING  10/25/2013    Performed by Lauro Rossi M.D. at SURGERY SAME DAY [facility]   • CARPAL TUNNEL RELEASE      right       Family history:   History reviewed. No pertinent family  history.    Social history:   Social History     Socioeconomic History   • Marital status: Single     Spouse name: Not on file   • Number of children: Not on file   • Years of education: Not on file   • Highest education level: Not on file   Occupational History   • Not on file   Social Needs   • Financial resource strain: Not on file   • Food insecurity:     Worry: Not on file     Inability: Not on file   • Transportation needs:     Medical: Not on file     Non-medical: Not on file   Tobacco Use   • Smoking status: Never Smoker   • Smokeless tobacco: Never Used   Substance and Sexual Activity   • Alcohol use: Yes     Comment: 1 beer a day occassionally   • Drug use: No   • Sexual activity: Not on file   Lifestyle   • Physical activity:     Days per week: Not on file     Minutes per session: Not on file   • Stress: Not on file   Relationships   • Social connections:     Talks on phone: Not on file     Gets together: Not on file     Attends [employer] service: Not on file     Active member of club or organization: Not on file     Attends meetings of clubs or organizations: Not on file     Relationship status: Not on file   • Intimate partner violence:     Fear of current or ex partner: Not on file     Emotionally abused: Not on file     Physically abused: Not on file     Forced sexual activity: Not on file   Other Topics Concern   • Not on file   Social History Narrative   • Not on file       Current medications:   Current Outpatient Medications   Medication   • Cyanocobalamin (B-12) 1000 MCG Tab   • KRILL OIL PO   • ergocalciferol (DRISDOL) 12727 UNIT capsule   • aspirin EC (ECOTRIN) 81 MG Tablet Delayed Response   • Garlic TABS   • ondansetron (ZOFRAN) 4 MG TABS     No current facility-administered medications for this visit.        Medication Allergy:  No Known Allergies    Review of systems:   Constitutional: denies fever, night sweats, weight loss.   Eyes: denies acute vision change, eye pain or secretion.  "  Ears, Nose, Mouth, Throat: denies nasal secretion, nasal bleeding, difficulty swallowing, hearing loss, tinnitus, vertigo, ear pain, acute dental problems, oral ulcers or lesions.   Endocrine: denies recent weight changes, heat or cold intolerance, polyuria, polydypsia, polyphagia,abnormal hair growth.  Cardiovascular: denies new onset of chest pain, palpitations, syncope, or dyspnea of exertion.  Pulmonary: denies shortness of breath, new onset of cough, hemoptysis, wheezing, chest pain or flu-like symptoms.   GI: denies nausea, vomiting, diarrhea, GI bleeding, change in appetite, abdominal pain, and change in bowel habits.  : denies dysuria, urinary incontinence, hematuria.  Heme/oncology: denies history of easy bruising or bleeding. No history of cancer, DVTor PE.  Allergy/immunology: denies hives/urticaria, or itching.   Dermatologic: denies new rash, or new skin lesions.  Musculoskeletal:denies joint swelling or pain, muscle pain, neck and back pain. Neurologic: denies headaches, acute visual changes, facial droopiness, muscle weakness (focal or generalized), anesthesia, ataxia, change in speech or language, memory loss, abnormal movements, seizures, loss of consciousness, or episodes of confusion.   Psychiatric: denies symptoms of depression, anxiety, hallucinations, mood swings or changes, suicidal or homicidal thoughts.     Physical examination:   Vitals:    08/22/19 1149   BP: 146/58   BP Location: Left arm   Patient Position: Sitting   BP Cuff Size: Adult   Pulse: 68   Resp: 16   Temp: 36.4 °C (97.6 °F)   TempSrc: Temporal   SpO2: 98%   Weight: 75.8 kg (167 lb)   Height: 1.753 m (5' 9\")     General: Patient in no acute distress, pleasant and cooperative.  HEENT: Normocephalic, no signs of acute trauma.   Neck: supple, no meningeal signs or carotid bruits. There is normal range of motion. No tenderness on exam.   Chest: clear to auscultation. No cough.   CV: RRR, no murmurs.   Skin: no signs of acute " rashes or trauma.   Musculoskeletal: joints exhibit full range of motion, without any pain to palpation. There are no signs of joint or muscle swelling. There is no tenderness to deep palpation of muscles.   Psychiatric: No hallucinatory behavior. Denies symptoms of depression or suicidal ideation. Mood and affect appear normal on exam.     NEUROLOGICAL EXAM:   Mental status, orientation: Awake, alert and fully oriented.   Speech and language: speech is clear and fluent. The patient is able to name, repeat and comprehend.   Memory: There is intact recollection of recent and remote events.   Cranial nerve exam: Pupils are 3-4 mm bilaterally and equally reactive to light and accommodation. Visual fields are intact by confrontation. There is no nystagmus on primary or secondary gaze. Intact full EOM in all directions of gaze. Face appears symmetric. Sensation in the face is intact to light touch. Uvula is midline. Palate elevates symmetrically. Tongue is midline and without any signs of tongue biting or fasciculations. Sternocleidomastoid muscles exhibit is normal strength bilaterally. Shoulder shrug is intact bilaterally.   Motor exam: Strength is 5/5 in all extremities. Tone is normal. No abnormal movements were seen on exam.   Sensory exam reveals normal sense of light touch in all extremities.   Deep tendon reflexes:  2+ throughout, with absent ankle jerks. Plantar responses are flexor. There is no clonus.   Coordination: shows a normal finger-nose-finger. Normal rapidly alternating movements.   Gait: The patient was able to get up from seated position on first attempt without requiring assistance. Found to be steady when walking. Movements were fluid with normal arm swing. The patient was able to turn without difficulties or tendency to fall. Romberg examination mild swaying in all directions.      ANCILLARY DATA REVIEWED:     Lab Data Review:  Reviewed in chart.  Vitamin D deficiency.  Abnormal hemoglobin A1c and  2-hour glucose tolerance test.  Elevated proteins in the CSF, with normal cells.    Records reviewed:   Chart reviewed.        Imaging:   MRI cervical spine without contrast, 6/22/2019:  Multilevel degenerative changes of the cervical spine as described above.  (I personally reviewed images with the patient, multilevel degeneration, no cord compression or signal abnormality).        Nerve conduction study and EMG, 7/17/2019:  DIAGNOSTIC INTERPRETATION:   Extensive electrodiagnostic studies were performed to the right upper and right lower extremities, and to a limited degree, for comparison purposes, to the left upper extremity. The studies were abnormal.    DISCUSSION:   1)-Widespread, moderate to severe, sensory and motor peripheral polyneuropathy with conduction blocks and temporal dispersion, suggestive of a chronic acquired demyelinating polyneuropathy (CIDP).   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               ASSESSMENT AND PLAN:    1. CIDP (chronic inflammatory demyelinating polyneuropathy) (HCC)      2. Vitamin D deficiency      3. Screening for depression      4. Neurapraxia of right ulnar nerve, initial encounter      5. Altered glucose metabolism      6. Health care maintenance         CLINICAL DISCUSSION:   The patient presented in the EMG lab for evaluation of possible peripheral polyneuropathy.  He has a history of right carpal tunnel syndrome, status post release twice, with significant disability which is residual and chronic, with atrophy of the thenar region.  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  He has also atrophy and weakness with the hyperthenar region as well.  The patient has had  paresthesias for a few months or more.   He states that these are improved now, only on his toes.  He tells me that about the same time he also felt generally weak, but this has resolved. He does not recall of a single event where he was generally weak or had ascending paresthesias.  He has not received any recent vaccinations and have not had any recent surgeries.  He denies any neck or back pain.  He had an MRI of the cervical spine, which did not show any lesions or significant compression.  The patient denies a history of cancer.  Remotely, he had served in the [employer] in [employer], but denies having had any issues at that time.  He denies a history of stroke, MS, or any other neurological problems.  Nerve conduction and EMG was suggestive of a widespread, moderate to severe, sensory and motor peripheral polyneuropathy of the demyelinating type.  The findings appear to be chronic in nature.  Additional work-up shows altered glucose metabolism, with a hemoglobin A1c of 6.1.    There is no monoclonal protein present.  His CSF shows a mildly elevated protein with normal cells, which could be reflective of CIDP versus finding of patients with an abnormal glucose metabolism.  The patient understands that the altered glucose metabolism may be enough to give him a polyneuropathy. We discussed the natural history of CIDP.  The patient is aware of possible progression and disability.  We discussed management options, however he is not interested at this point, states he feels better, denies any weakness or ataxia at this point, and would like to follow-up with us in a few month.  He was advised that he should call us should he have any worsening of his symptoms.    His right carpal tunnel, and right ulnar neuropathy appear chronic in nature, on EMG, there were no signs of active denervation to suggest active disease, it appears that he is chronic weakness on the right hand is stable.  I do not see any reason for further  interventions in the right ulnar or median nerve at this point.  History of left thoracic shingles, status post postherpetic neuralgia, now resolved, about 2 years ago.      Diet and exercise have been discussed at length with the patient, attempt for normalization of glucose metabolism.    Vitamin D deficiency, started on supplementation.      FOLLOW-UP:   Return in about 3 months (around 11/22/2019).          EDUCATION AND COUNSELING:  -Discussed regular exercise program and prevention of cardiovascular disease, including stroke.   -Discussed healthy lifestyle, including: healthy diet (rich in fruits, vegetables, nuts and healthy oils); proper hydration, and adequate sleep hygiene (allowing 7-8 hrs of overnight sleep).    The patient understands and agrees that due to the complexity of his/her diagnosis, results of any testing and further recommendations will typically be discussed/made during a face to face encounter in my office. The patient and/or family further understands it is their responsibility to keep proper follow up.       Frandy Cruz MD   Epilepsy and Neurodiagnostics.   Clinical  of Neurology [facility].   Diplomate in Neurology, Epilepsy, and Electrodiagnostic Medicine.   Office: [phone]  Fax: [phone]    BILLING DOCUMENTATION:     I have performed physical exam and reviewed and updated ROS and plan today 8/22/2019. In review of that note, there are no new changes except as documented above.     Counseling:  I spent greater than 50% time face-to-face time of a total of 56 minutes visit. Over 50% of the time of the visit today was spent on counseling and or coordination of care wtih the patient and/or family, with greater than 50% of the total discussing my assessment and plan as stated above.

## 2019-08-27 DIAGNOSIS — G62.9 POLYNEUROPATHY: ICD-10-CM

## 2019-08-27 DIAGNOSIS — R73.09 ALTERED GLUCOSE METABOLISM: ICD-10-CM

## 2019-11-06 ENCOUNTER — OFFICE VISIT (OUTPATIENT)
Dept: NEUROLOGY | Facility: MEDICAL CENTER | Age: 74
End: 2019-11-06
Payer: MEDICARE

## 2019-11-06 VITALS
HEIGHT: 69 IN | RESPIRATION RATE: 18 BRPM | OXYGEN SATURATION: 96 % | WEIGHT: 161 LBS | SYSTOLIC BLOOD PRESSURE: 140 MMHG | DIASTOLIC BLOOD PRESSURE: 68 MMHG | HEART RATE: 66 BPM | TEMPERATURE: 97 F | BODY MASS INDEX: 23.85 KG/M2

## 2019-11-06 DIAGNOSIS — R73.09 ALTERED GLUCOSE METABOLISM: ICD-10-CM

## 2019-11-06 DIAGNOSIS — G56.21 LESION OF RIGHT ULNAR NERVE: ICD-10-CM

## 2019-11-06 DIAGNOSIS — G56.01 CARPAL TUNNEL SYNDROME OF RIGHT WRIST: ICD-10-CM

## 2019-11-06 DIAGNOSIS — E55.9 VITAMIN D DEFICIENCY: ICD-10-CM

## 2019-11-06 DIAGNOSIS — R42 VERTIGO: ICD-10-CM

## 2019-11-06 DIAGNOSIS — G62.9 PERIPHERAL POLYNEUROPATHY: ICD-10-CM

## 2019-11-06 PROCEDURE — 99215 OFFICE O/P EST HI 40 MIN: CPT | Performed by: PSYCHIATRY & NEUROLOGY

## 2019-11-06 NOTE — PROGRESS NOTES
Chief Complaint   Patient presents with   • Follow-Up     CIDP (chronic inflammatory demyelinating polyneuropathy) (Formerly [facility])       Problem List Items Addressed This Visit     Carpal tunnel syndrome    Relevant Orders    REFERRAL TO HAND SURGERY    Lesion of ulnar nerve    Relevant Orders    REFERRAL TO HAND SURGERY      Other Visit Diagnoses     Peripheral polyneuropathy        Relevant Orders    Nutrition (Dietary) Consult    Altered glucose metabolism        Relevant Orders    HEMOGLOBIN A1C    Nutrition (Dietary) Consult    Vertigo        Vitamin D deficiency              Interim history:  Lei Isaacs returns in follow-up.  The patient still has not been able to see a dietitian, states no one called her back.  He has improved his diet, and is exercising for a few minutes most days.  He has a gym membership but is not going.  He denies any clear worsening, he continues to be able to work, work out, ambulates without any difficulties.  He denies symptoms related to ataxia or weakness of the lower extremities.  There are no visual changes, shortness of breath or difficulty swallowing.  He denies widespread pain.  Continues to complain of pain in the right forearm, this is chronic in nature after transposition of the right ulnar nerve years ago.  Is requesting a new referral to hand surgery for possible MRI of the forearm.     The patient indicates that one day when he was working out of the gym, with movement of the head, had a mild episode of vertigo.  This has not recurred, no headaches, nausea or vomiting.    He denies any falls.    His mood is good.  He indicates he has been anxious about this process.    He has been reading online about the possibility of CIDP and management, and is afraid of any possible management interventions at this point.        Past medical history:   Past Medical History:   Diagnosis Date   • Arrhythmia     palpitations, increased heart beat on occasion       Past surgical  history:   Past Surgical History:   Procedure Laterality Date   • CARPAL TUNNEL RELEASE  10/25/2013    Performed by Lauro Rossi M.D. at SURGERY SAME DAY [facility] ORS   • NERVE ULNAR TRANSFER  10/25/2013    Performed by Lauro Rossi M.D. at SURGERY SAME DAY [facility] ORS   • EPICONDYLAR STRIPPING  10/25/2013    Performed by Lauro Rossi M.D. at SURGERY SAME DAY [facility] ORS   • CARPAL TUNNEL RELEASE      right       Family history:   History reviewed. No pertinent family history.    Social history:   Social History     Socioeconomic History   • Marital status: Single     Spouse name: Not on file   • Number of children: Not on file   • Years of education: Not on file   • Highest education level: Not on file   Occupational History   • Not on file   Social Needs   • Financial resource strain: Not on file   • Food insecurity:     Worry: Not on file     Inability: Not on file   • Transportation needs:     Medical: Not on file     Non-medical: Not on file   Tobacco Use   • Smoking status: Never Smoker   • Smokeless tobacco: Never Used   Substance and Sexual Activity   • Alcohol use: Yes     Comment: 1 beer a day occassionally   • Drug use: No   • Sexual activity: Not on file   Lifestyle   • Physical activity:     Days per week: Not on file     Minutes per session: Not on file   • Stress: Not on file   Relationships   • Social connections:     Talks on phone: Not on file     Gets together: Not on file     Attends Zoroastrian service: Not on file     Active member of club or organization: Not on file     Attends meetings of clubs or organizations: Not on file     Relationship status: Not on file   • Intimate partner violence:     Fear of current or ex partner: Not on file     Emotionally abused: Not on file     Physically abused: Not on file     Forced sexual activity: Not on file   Other Topics Concern   • Not on file   Social History Narrative   • Not on file       Current medications:   Current  "Outpatient Medications   Medication   • Cyanocobalamin (B-12) 1000 MCG Tab   • KRILL OIL PO   • ergocalciferol (DRISDOL) 78420 UNIT capsule   • aspirin EC (ECOTRIN) 81 MG Tablet Delayed Response   • Garlic TABS     No current facility-administered medications for this visit.        Medication Allergy:  No Known Allergies    Review of systems:   Indicated in HPI.    Physical examination:   Vitals:    11/06/19 1024   BP: 140/68   BP Location: Left arm   Patient Position: Sitting   BP Cuff Size: Adult   Pulse: 66   Resp: 18   Temp: 36.1 °C (97 °F)   TempSrc: Temporal   SpO2: 96%   Weight: 73 kg (161 lb)   Height: 1.753 m (5' 9\")     General: Patient in no acute distress, pleasant and cooperative.  HEENT: Normocephalic, no signs of acute trauma.   Neck: supple, no meningeal signs or carotid bruits. There is normal range of motion. No tenderness on exam.   Chest: clear to auscultation. No cough.   CV: RRR, no murmurs.   Skin: no signs of acute rashes or trauma.   Musculoskeletal: joints exhibit full range of motion, without any pain to palpation. There are no signs of joint or muscle swelling. There is no tenderness to deep palpation of muscles.   Psychiatric: No hallucinatory behavior. Denies symptoms of depression or suicidal ideation. Mood and affect appear normal on exam.     NEUROLOGICAL EXAM:   Mental status, orientation: Awake, alert and fully oriented.   Speech and language: speech is clear and fluent. The patient is able to name, repeat and comprehend.   Memory: There is intact recollection of recent and remote events.   Cranial nerve exam: Pupils are 3-4 mm bilaterally and equally reactive to light and accommodation. Visual fields are intact by confrontation.  There is no nystagmus on primary or secondary gaze. Intact full EOM in all directions of gaze. Face appears symmetric. Sensation in the face is intact to light touch. Uvula is midline. Palate elevates symmetrically. Tongue is midline and without any signs " of tongue biting or fasciculations. Sternocleidomastoid muscles exhibit is normal strength bilaterally. Shoulder shrug is intact bilaterally.   Motor exam: Strength is 5/5 in all extremities. Tone is normal. No abnormal movements were seen on exam.   Sensory exam reveals normal sense of light touch in all extremities.   Deep tendon reflexes:  2+ throughout, except for absent ankle jerks. Plantar responses are flexor. There is no clonus.   Coordination: shows a normal finger-nose-finger. Normal rapidly alternating movements.   Gait: The patient was able to get up from seated position on first attempt without requiring assistance. Found to be steady when walking. Movements were fluid with normal arm swing. The patient was able to turn without difficulties or tendency to fall. Romberg examination mild swaying in all directions.      ANCILLARY DATA REVIEWED:     Lab Data Review:  Reviewed in chart.  Vitamin D was low, hemoglobin A1c was 6.1.  There was elevated protein in the CSF.    Records reviewed:   Chart reviewed.    Imaging:   MRI cervical spine without contrast, 6/22/2019:  Multilevel degenerative changes of the cervical spine as described above.  (I personally reviewed images with the patient, multilevel degeneration, no cord compression or signal abnormality).        Nerve conduction study and EMG, 7/17/2019:  DIAGNOSTIC INTERPRETATION:   Extensive electrodiagnostic studies were performed to the right upper and right lower extremities, and to a limited degree, for comparison purposes, to the left upper extremity. The studies were abnormal.    DISCUSSION:   1)-Widespread, moderate to severe, sensory and motor peripheral polyneuropathy with conduction blocks and temporal dispersion, suggestive of a chronic acquired demyelinating polyneuropathy (CIDP).   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        ASSESSMENT AND PLAN:    1. Peripheral polyneuropathy  - Nutrition (Dietary) Consult    2. Altered glucose metabolism  - HEMOGLOBIN A1C; Future  - Nutrition (Dietary) Consult    3. Lesion of right ulnar nerve  - REFERRAL TO HAND SURGERY    4. Carpal tunnel syndrome of right wrist  - REFERRAL TO HAND SURGERY    5. Vertigo    6. Vitamin D deficiency        CLINICAL DISCUSSION:   The patient presented initially at the EMG lab for evaluation of possible peripheral polyneuropathy.  He has a history of right carpal tunnel syndrome, status post release twice, with significant disability which is residual and chronic, with atrophy of the thenar region.  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  He has also atrophy and weakness with the hyperthenar region as well.  The patient states that the forearm pain continues, and is requesting a referral for a third opinion with a hand surgeon, referral has been given, I provided the name of Dr. Hopkins and contact information for patient to schedule.   He remains with paresthesias only on the toes.  He denies any significant issues with weakness or walking or balance.    He does not recall of a single event in the past where he was generally weak or had ascending paresthesias.  He has not received any recent vaccinations and have not had any recent surgeries.  He continues to deny any neck or back pain.  He had an MRI of the cervical spine, which did not show any lesions or significant compression.  The patient denies a history of cancer.  Remotely, he had served in the [employer] in [place], but denies having had any issues at that time.  There is no history of stroke, MS, or any other neurological problems.  Nerve conduction and EMG done in this office, was suggestive of a widespread, moderate to severe,  sensory and motor peripheral polyneuropathy of the demyelinating type.  The findings appear to be chronic in nature.  Additional work-up has shown altered glucose metabolism, with a hemoglobin A1c of 6.1.    There is no monoclonal protein present, although immunofixation was not done, this can be repeated in the future.  His CSF shows a mildly elevated protein with normal cells, which could be reflective of CIDP versus finding of patients with an abnormal glucose metabolism.  The patient understands that the altered glucose metabolism may be enough to give him a polyneuropathy, which may exhibited demyelinating pattern.  It is uncertain to me whether he has true CIDP at this point, clinically he is doing really well without any elements of weakness or ataxia or progression or flareups.  The most likely scenario is that this neuropathy is related to his altered glucose metabolism.  We discussed possibly obtaining biopsies of the sural nerve, he is not interested at this point. We we also discussed the natural history of CIDP, and possible interventions.  Because were not certain of CIDP at this point, he is refusing additional work-up, the patient has agreed to continue to wait, and focus on diet and exercise as he is changing his lifestyle in an attempt to lower his hemoglobin A1c and improved his blood sugar metabolism.  The natural history of polyneuropathies have been discussed, including progression to disability.  He has been stable since since last.  The patient will call us with any worsening or flareup of symptoms.      His right carpal tunnel, and right ulnar neuropathy appear chronic in nature, on EMG, there were no signs of active denervation to suggest active disease, it appears that he is chronic weakness on the right hand is stable.  I do not see any reason for further interventions in the right ulnar or median nerve at this point.  He is requesting a third opinion evaluation with hand surgery,  referred to Dr. Hopkins, interested in obtaining an MRI of the right forearm.    History of left thoracic shingles, status post postherpetic neuralgia, now resolved, over 2 years ago.      Diet and exercise have been discussed at length again and with the patient, attempt for normalization of glucose metabolism.     Diagnosed with vitamin D deficiency, continue with weekly supplementation.  We will repeat levels in the future.           FOLLOW-UP:   Return in about 3 months (around 2/6/2020).          EDUCATION AND COUNSELING:  -Discussed regular exercise program and prevention of cardiovascular disease, including stroke.   -Discussed healthy lifestyle, including: healthy diet (rich in fruits, vegetables, nuts and healthy oils); proper hydration, and adequate sleep hygiene (allowing 7-8 hrs of overnight sleep).    The patient understands and agrees that due to the complexity of his/her diagnosis, results of any testing and further recommendations will typically be discussed/made during a face to face encounter in my office. The patient and/or family further understands it is their responsibility to keep proper follow up.       Frandy Cruz MD   Epilepsy and Neurodiagnostics.   Clinical  of Neurology [facility].   Diplomate in Neurology, Epilepsy, and Electrodiagnostic Medicine.   Office: [phone]  Fax: [phone]        BILLING DOCUMENTATION:     I have performed physical exam and reviewed and updated ROS and plan today 11/6/2019. In review of that note, there are no new changes except as documented above.     Counseling:  I spent greater than 50% time face-to-face time of a total of 52 minutes visit. Over 50% of the time of the visit today was spent on counseling and or coordination of care wtih the patient and/or family, with greater than 50% of the total discussing my assessment and plan as stated above.

## 2019-11-15 ENCOUNTER — HOSPITAL ENCOUNTER (OUTPATIENT)
Dept: LAB | Facility: MEDICAL CENTER | Age: 74
End: 2019-11-15
Attending: PSYCHIATRY & NEUROLOGY
Payer: MEDICARE

## 2019-11-15 DIAGNOSIS — R73.09 ALTERED GLUCOSE METABOLISM: ICD-10-CM

## 2019-11-15 LAB
EST. AVERAGE GLUCOSE BLD GHB EST-MCNC: 126 MG/DL
HBA1C MFR BLD: 6 % (ref 0–5.6)

## 2019-11-15 PROCEDURE — 36415 COLL VENOUS BLD VENIPUNCTURE: CPT | Mod: GA

## 2019-11-15 PROCEDURE — 83036 HEMOGLOBIN GLYCOSYLATED A1C: CPT | Mod: GA

## 2020-01-23 ENCOUNTER — APPOINTMENT (OUTPATIENT)
Dept: NEUROLOGY | Facility: MEDICAL CENTER | Age: 75
End: 2020-01-23
Payer: MEDICARE

## 2020-02-25 DIAGNOSIS — G62.9 PERIPHERAL POLYNEUROPATHY: ICD-10-CM

## 2020-02-25 DIAGNOSIS — R73.09 ALTERED GLUCOSE METABOLISM: ICD-10-CM

## 2020-03-05 ENCOUNTER — NON-PROVIDER VISIT (OUTPATIENT)
Dept: HEALTH INFORMATION MANAGEMENT | Facility: MEDICAL CENTER | Age: 75
End: 2020-03-05
Payer: MEDICARE

## 2020-03-05 VITALS — BODY MASS INDEX: 23.73 KG/M2 | WEIGHT: 160.7 LBS

## 2020-03-05 DIAGNOSIS — R73.09 ALTERED GLUCOSE METABOLISM: ICD-10-CM

## 2020-03-05 DIAGNOSIS — G62.9 PERIPHERAL POLYNEUROPATHY: ICD-10-CM

## 2020-03-05 PROCEDURE — 97802 MEDICAL NUTRITION INDIV IN: CPT | Performed by: DIETITIAN, REGISTERED

## 2020-03-05 ASSESSMENT — FIBROSIS 4 INDEX: FIB4 SCORE: 0.96

## 2020-03-05 NOTE — PROGRESS NOTES
3/5/2020    Pcp Pt States None  74 y.o.   Time in/out: 9:56-10:02    Anthropometrics/Objective  Vitals:    03/05/20 1011   Weight: 72.9 kg (160 lb 11.2 oz)       Body mass index is 23.73 kg/m².      Estimated Caloric needs: ~1500 kcals/day (correia-benedict for weigh maintenance)   See comprehensive patient history form for further information     Subjective:  · Believes his neuropathy is r/t an error during surgery on a group of muscle nerves in his hand  · Changed diet 6 months ago: cooks with sunflower oil, eats less beef, stopped eating potatoes and whole milk  · [facility]  · Eats 3 meals/day, occasionally snacks in the evening. Rarely eats out  · Uses bowflex machine (cardio + strength training) 20 min/day    Nutrition Diagnosis (PES Statement)  · Altered nutrition related lab values related to endocrine dysfunction as evidenced by HgbA1c of 6.0    Client history:  Condition(s) associated with a diagnosis or treatment (specify): pre-diabetes    Biochemical data, medical test and procedures  Lab Results   Component Value Date/Time    HBA1C 6.0 (H) 11/15/2019 11:59 AM   @  No results found for: POCGLUCOSE  No results found for: CHOLSTRLTOT, LDL, HDL, TRIGLYCERIDE      Nutrition Intervention    Meal and Snack  Recommend a general/healthful diet    Comprehensive Nutrition education Instruction or training leading to in-depth nutrition related knowledge about:  Combine carb, protein and fat at each meal, Metabolism of carb, protein, fat, Physical activity/exercise, Portion control and Heart-healthy guidelines    Monitoring & Evaluation Plan    Behavioral-Environmental:  Physical activity: walk after meals for better BG control    Food / Nutrient Intake:  Food intake: follow MyPlate for balance (protein with CHO) and portion control    Physical Signs / Symptoms:  HbA1c profiles: < 6.0 at next blood draw    Assessment Notes: Rene estevez  referred for altered glucose metabolism and peripheral polyneuropathy. He states he is here because he wants to neutralize advancing neuropathy and he wants to learn how to eat better. He was eager to learn and very appreciative of the information. We reviewed nutrition basics along with the Plate Planner, emphasizing the role of CHO on BG, lean proteins, and unsaturated fats in moderation for hearth health. Rene was surprised to learn that certain foods were considered CHO (fruits, beans/legumes, dairy). I recommended that Rene aim for better meal balance by adding a lean protein source at breakfast and with his lunch of yogurt/fruit. I also suggested that he add more NS vegetables to lunch and control his portions of CHO at breakfast and lunch. I explained the importance of exercise for BG control and suggested that he take a short walk after lunch; Rene would like to increase his duration on the Bowflex. Pt verbalized understanding and all of his/her nutrition-related questions were answered      Follow-up: PRN. Pt would like to get A1c checked and will call to schedule

## 2021-01-15 ENCOUNTER — OFFICE VISIT (OUTPATIENT)
Dept: NEUROLOGY | Facility: MEDICAL CENTER | Age: 76
End: 2021-01-15
Attending: STUDENT IN AN ORGANIZED HEALTH CARE EDUCATION/TRAINING PROGRAM
Payer: MEDICARE

## 2021-01-15 VITALS
DIASTOLIC BLOOD PRESSURE: 62 MMHG | RESPIRATION RATE: 16 BRPM | HEART RATE: 60 BPM | TEMPERATURE: 98.2 F | OXYGEN SATURATION: 99 % | WEIGHT: 151 LBS | HEIGHT: 69 IN | BODY MASS INDEX: 22.36 KG/M2 | SYSTOLIC BLOOD PRESSURE: 140 MMHG

## 2021-01-15 DIAGNOSIS — G61.81 CIDP (CHRONIC INFLAMMATORY DEMYELINATING POLYNEUROPATHY) (HCC): ICD-10-CM

## 2021-01-15 DIAGNOSIS — Z23 NEED FOR VACCINATION: ICD-10-CM

## 2021-01-15 DIAGNOSIS — G62.9 POLYNEUROPATHY: ICD-10-CM

## 2021-01-15 PROCEDURE — 99215 OFFICE O/P EST HI 40 MIN: CPT | Performed by: STUDENT IN AN ORGANIZED HEALTH CARE EDUCATION/TRAINING PROGRAM

## 2021-01-15 PROCEDURE — 99211 OFF/OP EST MAY X REQ PHY/QHP: CPT | Performed by: STUDENT IN AN ORGANIZED HEALTH CARE EDUCATION/TRAINING PROGRAM

## 2021-01-15 ASSESSMENT — FIBROSIS 4 INDEX: FIB4 SCORE: 0.97

## 2021-01-15 ASSESSMENT — PATIENT HEALTH QUESTIONNAIRE - PHQ9: CLINICAL INTERPRETATION OF PHQ2 SCORE: 0

## 2021-01-15 NOTE — PROGRESS NOTES
GENERAL NEUROLOGY CLINIC INITIAL ENCOUNTER  CHIEF COMPLAINT(S): CIDP    History of present illness:  Lei Isaacs 75 y.o. male presents today for polyneuropathy.  His clinical course, electrodiagnostic testing, and lumbar puncture is suggestive of CIDP.  He reports occasional bouts of exacerbation of numbness particularly in his legs and feet.  He currently feels like there is been progression of his symptoms since he was last seen in our clinic several months ago.  Specifically he notes numbness and dysesthesias in the bottom of his feet and the back of his legs.  He feels like his knees buckle.  He has not fallen.  No issues with eating/chewing/swallowing.  No shortness of breath.  No fever or chills.  No double vision.        Patient's PMH, PSH, FH, and SH were reviewed.    Medications and allergies were reviewed.        Past medical history:   Past Medical History:   Diagnosis Date   • Arrhythmia     palpitations, increased heart beat on occasion       Past surgical history:   Past Surgical History:   Procedure Laterality Date   • CARPAL TUNNEL RELEASE  10/25/2013    Performed by Lauro Rossi M.D. at SURGERY SAME DAY [facility]   • NERVE ULNAR TRANSFER  10/25/2013    Performed by Lauro Rossi M.D. at SURGERY SAME DAY [facility]   • EPICONDYLAR STRIPPING  10/25/2013    Performed by Lauro Rossi M.D. at SURGERY SAME DAY [facility]   • CARPAL TUNNEL RELEASE      right       Family history:   History reviewed. No pertinent family history.    Social history:   Social History     Socioeconomic History   • Marital status: Single     Spouse name: Not on file   • Number of children: Not on file   • Years of education: Not on file   • Highest education level: Not on file   Occupational History   • Not on file   Social Needs   • Financial resource strain: Not on file   • Food insecurity     Worry: Not on file     Inability: Not on file   • Transportation needs     Medical: Not on file      Non-medical: Not on file   Tobacco Use   • Smoking status: Never Smoker   • Smokeless tobacco: Never Used   Substance and Sexual Activity   • Alcohol use: Yes     Comment: 1 beer a day occassionally   • Drug use: No   • Sexual activity: Not on file   Lifestyle   • Physical activity     Days per week: Not on file     Minutes per session: Not on file   • Stress: Not on file   Relationships   • Social connections     Talks on phone: Not on file     Gets together: Not on file     Attends [employer] service: Not on file     Active member of club or organization: Not on file     Attends meetings of clubs or organizations: Not on file     Relationship status: Not on file   • Intimate partner violence     Fear of current or ex partner: Not on file     Emotionally abused: Not on file     Physically abused: Not on file     Forced sexual activity: Not on file   Other Topics Concern   • Not on file   Social History Narrative   • Not on file       Current medications:   Current Outpatient Medications   Medication   • Cyanocobalamin (B-12) 1000 MCG Tab   • KRILL OIL PO   • ergocalciferol (DRISDOL) 07105 UNIT capsule   • aspirin EC (ECOTRIN) 81 MG Tablet Delayed Response   • Garlic TABS     No current facility-administered medications for this visit.        Medication Allergy:  No Known Allergies      Review of systems:   Pertinent positives and negatives are as outlined above      Physical examination:   Vitals:    01/15/21 1352   BP: 140/62   Pulse: 60   Resp: 16   Temp: 36.8 °C (98.2 °F)   SpO2: 99%       General: Patient in no acute distress, pleasant and cooperative.  HEENT: Normocephalic, no signs of acute trauma.   Neck: appears supple, here is normal range of motion. No tenderness on exam.   Chest: clear to auscultation. Symmetrical chest rise with inhalation. No cough.   CV: RRR, no murmurs.   Skin: no signs of acute rashes or trauma.   Musculoskeletal: joints exhibit full range of motion. There are no signs of joint  or muscle swelling.   Psychiatric: pertinent positives as discussed above    NEUROLOGICAL EXAM:   Mental status:  orientation: Awake, alert and oriented to self, month, year, situation.  Attention: Intact  Visio-spatial testing: NT  Frontal release signs: Absent  Speech and language: speech is clear and fluent. The patient is able to name, repeat and comprehend. No impairment in fluency. No word substitions or paraphasic errors  Memory: There is intact recollection of recent and remote events.   Cranial nerve exam:   I: smell Not tested   II: visual acuity  OS: NT   OD: NT   II: visual fields Full to confrontation  Visual neglect: absent   II: pupils Equal, round, reactive to light   III,VII: ptosis None   III,IV,VI: extraocular muscles  Full ROM   V: mastication Normal   V: facial light touch sensation  Normal   V,VII: corneal reflex  Not tested   VII: facial muscle function - upper  Normal   VII: facial muscle function - lower Normal   VIII: hearing Not tested   IX: soft palate elevation  Normal   IX,X: gag reflex Not tested   XI: trapezius strength  5/5   XI: sternocleidomastoid strength 5/5   XI: neck flexion strength  5/5         Motor exam:   • He has bilateral atrophy of the thenar and hyperthenar eminences.  Mild diffuse weakness in the distal musculature in the upper extremity.  4+ out of 5 foot plantar flexor weakness and toe dorsiflexor weakness and ankle dorsiflexion weakness.  Otherwise 5 out of 5 throughout in the lower extremity  • Tone is normal.  • No abnormal movements were seen on exam.   • No muscle fasciculation  • No areas of atrophy  •   Sensory exam mild to moderate sensory loss in the hands and feet to vibration  Deep tendon reflexes:  1+ throughout in b/l UE. 2+ patella and hamstring. Trace ankle jerk. Equivocal babinski bilaterally. .   Coordination: shows a normal finger-nose-finger. Normal rapidly alternating movements. Heel-knee-shin movements smooth and coordinated bilaterally.   Gait:    • The patient was able to get up from seated position on first attempt without requiring assistance.   • Found to be steady when walking.   • Has difficulty with toe walking and heel walking particularly on the right.  • Movements were fluid with normal arm swing.   • The patient was able to turn without difficulties or tendency to fall.           ANCILLARY DATA REVIEWED:       Lab Data Review:  Reviewed    Records reviewed:   Reviewed    Imaging:   Reviewed    EEG:  NA      ASSESSMENT, PLAN, EDUCATION/COUNSELING:  This is a 75-year-old male with probable CIDP.  He has not tried IVIG so we will start this since he has had ongoing flares and disease progression as noted above.  Will order some routine labs.  Will place an order for IVIG.  He is doing excellent in regards to changing his diet.  He has a dietitian and has been a new very healthy and is lost several pounds over the course of several months.  We will touch base in 3 months to see how he is doing and see how he is responding to the IVIG.  We reviewed extensively how IVIG works and side effects to treatment.  Patient was in agreement with the plan as outlined.        Visit Diagnoses     ICD-10-CM   1. CIDP (chronic inflammatory demyelinating polyneuropathy) ([facility])  G61.81   2. Polyneuropathy  G62.9        Orders Placed This Encounter   • CBC WITH DIFFERENTIAL   • Comp Metabolic Panel   • ASIALO GM1 ANTIBODY, IGG,IGM   • CRP QUANTITIVE (NON-CARDIAC)   • Sed Rate   • GM1 AB IGG,IGM            FOLLOW-UP:   3 months            BILLING DOCUMENTATION:       Counseling:  I spent a total of 65 minutes of face-to-face time in this visit. Over 50% of the time of the visit today was spent on counseling and or coordination of care wtih the patient and/or family, as above in assessment in plan.       Fabrizio Landaverde MD  Epilepsy and General Neurology  Department of Neurology  Clinical  of Neurology [facility].

## 2021-01-19 DIAGNOSIS — G61.81 CIDP (CHRONIC INFLAMMATORY DEMYELINATING POLYNEUROPATHY) (HCC): ICD-10-CM

## 2021-01-19 RX ORDER — IMMUNE GLOBULIN INFUSION (HUMAN) 100 MG/ML
30 INJECTION, SOLUTION INTRAVENOUS; SUBCUTANEOUS ONCE
Status: CANCELLED
Start: 2021-01-20 | End: 2021-01-20

## 2021-01-29 ENCOUNTER — HOSPITAL ENCOUNTER (OUTPATIENT)
Dept: LAB | Facility: MEDICAL CENTER | Age: 76
End: 2021-01-29
Attending: STUDENT IN AN ORGANIZED HEALTH CARE EDUCATION/TRAINING PROGRAM
Payer: MEDICARE

## 2021-01-29 DIAGNOSIS — G61.81 CIDP (CHRONIC INFLAMMATORY DEMYELINATING POLYNEUROPATHY) (HCC): ICD-10-CM

## 2021-01-29 DIAGNOSIS — G62.9 POLYNEUROPATHY: ICD-10-CM

## 2021-01-29 LAB
ALBUMIN SERPL BCP-MCNC: 4.6 G/DL (ref 3.2–4.9)
ALBUMIN/GLOB SERPL: 1.7 G/DL
ALP SERPL-CCNC: 47 U/L (ref 30–99)
ALT SERPL-CCNC: 32 U/L (ref 2–50)
ANION GAP SERPL CALC-SCNC: 10 MMOL/L (ref 7–16)
AST SERPL-CCNC: 24 U/L (ref 12–45)
BASOPHILS # BLD AUTO: 0.5 % (ref 0–1.8)
BASOPHILS # BLD: 0.04 K/UL (ref 0–0.12)
BILIRUB SERPL-MCNC: 1.3 MG/DL (ref 0.1–1.5)
BUN SERPL-MCNC: 23 MG/DL (ref 8–22)
CALCIUM SERPL-MCNC: 10 MG/DL (ref 8.5–10.5)
CHLORIDE SERPL-SCNC: 102 MMOL/L (ref 96–112)
CO2 SERPL-SCNC: 26 MMOL/L (ref 20–33)
CREAT SERPL-MCNC: 0.78 MG/DL (ref 0.5–1.4)
CRP SERPL HS-MCNC: 0.04 MG/DL (ref 0–0.75)
EOSINOPHIL # BLD AUTO: 0.09 K/UL (ref 0–0.51)
EOSINOPHIL NFR BLD: 1.2 % (ref 0–6.9)
ERYTHROCYTE [DISTWIDTH] IN BLOOD BY AUTOMATED COUNT: 48.3 FL (ref 35.9–50)
ERYTHROCYTE [SEDIMENTATION RATE] IN BLOOD BY WESTERGREN METHOD: <1 MM/HOUR (ref 0–20)
GLOBULIN SER CALC-MCNC: 2.7 G/DL (ref 1.9–3.5)
GLUCOSE SERPL-MCNC: 91 MG/DL (ref 65–99)
HCT VFR BLD AUTO: 54.1 % (ref 42–52)
HGB BLD-MCNC: 18.1 G/DL (ref 14–18)
IMM GRANULOCYTES # BLD AUTO: 0.03 K/UL (ref 0–0.11)
IMM GRANULOCYTES NFR BLD AUTO: 0.4 % (ref 0–0.9)
LYMPHOCYTES # BLD AUTO: 1.43 K/UL (ref 1–4.8)
LYMPHOCYTES NFR BLD: 18.9 % (ref 22–41)
MCH RBC QN AUTO: 32.9 PG (ref 27–33)
MCHC RBC AUTO-ENTMCNC: 33.5 G/DL (ref 33.7–35.3)
MCV RBC AUTO: 98.4 FL (ref 81.4–97.8)
MONOCYTES # BLD AUTO: 0.51 K/UL (ref 0–0.85)
MONOCYTES NFR BLD AUTO: 6.8 % (ref 0–13.4)
NEUTROPHILS # BLD AUTO: 5.45 K/UL (ref 1.82–7.42)
NEUTROPHILS NFR BLD: 72.2 % (ref 44–72)
NRBC # BLD AUTO: 0 K/UL
NRBC BLD-RTO: 0 /100 WBC
PLATELET # BLD AUTO: 271 K/UL (ref 164–446)
PMV BLD AUTO: 11 FL (ref 9–12.9)
POTASSIUM SERPL-SCNC: 4.2 MMOL/L (ref 3.6–5.5)
PROT SERPL-MCNC: 7.3 G/DL (ref 6–8.2)
RBC # BLD AUTO: 5.5 M/UL (ref 4.7–6.1)
SODIUM SERPL-SCNC: 138 MMOL/L (ref 135–145)
WBC # BLD AUTO: 7.6 K/UL (ref 4.8–10.8)

## 2021-01-29 PROCEDURE — 86140 C-REACTIVE PROTEIN: CPT

## 2021-01-29 PROCEDURE — 80053 COMPREHEN METABOLIC PANEL: CPT

## 2021-01-29 PROCEDURE — 85025 COMPLETE CBC W/AUTO DIFF WBC: CPT

## 2021-01-29 PROCEDURE — 85652 RBC SED RATE AUTOMATED: CPT

## 2021-01-29 PROCEDURE — 36415 COLL VENOUS BLD VENIPUNCTURE: CPT

## 2021-01-29 PROCEDURE — 83516 IMMUNOASSAY NONANTIBODY: CPT

## 2021-02-03 LAB
GD1A GANGL AB SER IA-ACNC: 54 IV (ref 0–50)
GD1B GANGL AB SER IA-ACNC: 8 IV (ref 0–50)
GM1 ASIALO AB SER IA-ACNC: 10 IV (ref 0–50)
GM1 GANGL IGG+IGM SER QL IA: 8 IV (ref 0–50)
GM2 GANGL IGG+IGM SER QL IA: 21 IV (ref 0–50)
GQ1B GANGL AB SER-ACNC: 7 IV (ref 0–50)

## 2021-02-08 ENCOUNTER — OUTPATIENT INFUSION SERVICES (OUTPATIENT)
Dept: ONCOLOGY | Facility: MEDICAL CENTER | Age: 76
End: 2021-02-08
Attending: STUDENT IN AN ORGANIZED HEALTH CARE EDUCATION/TRAINING PROGRAM
Payer: MEDICARE

## 2021-02-08 VITALS
HEART RATE: 61 BPM | TEMPERATURE: 97.4 F | DIASTOLIC BLOOD PRESSURE: 64 MMHG | OXYGEN SATURATION: 98 % | RESPIRATION RATE: 18 BRPM | WEIGHT: 151.9 LBS | HEIGHT: 68 IN | BODY MASS INDEX: 23.02 KG/M2 | SYSTOLIC BLOOD PRESSURE: 144 MMHG

## 2021-02-08 DIAGNOSIS — G61.81 CIDP (CHRONIC INFLAMMATORY DEMYELINATING POLYNEUROPATHY) (HCC): ICD-10-CM

## 2021-02-08 PROCEDURE — 700111 HCHG RX REV CODE 636 W/ 250 OVERRIDE (IP): Mod: JG | Performed by: STUDENT IN AN ORGANIZED HEALTH CARE EDUCATION/TRAINING PROGRAM

## 2021-02-08 PROCEDURE — 96365 THER/PROPH/DIAG IV INF INIT: CPT

## 2021-02-08 PROCEDURE — 96366 THER/PROPH/DIAG IV INF ADDON: CPT

## 2021-02-08 RX ORDER — IMMUNE GLOBULIN INFUSION (HUMAN) 100 MG/ML
20 INJECTION, SOLUTION INTRAVENOUS; SUBCUTANEOUS ONCE
Status: CANCELLED
Start: 2021-02-09 | End: 2021-02-09

## 2021-02-08 RX ORDER — OMEGA-3S/DHA/EPA/FISH OIL/D3 300MG-1000
125 CAPSULE ORAL DAILY
COMMUNITY

## 2021-02-08 RX ORDER — IMMUNE GLOBULIN INFUSION (HUMAN) 100 MG/ML
30 INJECTION, SOLUTION INTRAVENOUS; SUBCUTANEOUS ONCE
Status: DISCONTINUED | OUTPATIENT
Start: 2021-02-08 | End: 2021-02-08

## 2021-02-08 RX ORDER — IMMUNE GLOBULIN INFUSION (HUMAN) 100 MG/ML
10 INJECTION, SOLUTION INTRAVENOUS; SUBCUTANEOUS ONCE
Status: COMPLETED | OUTPATIENT
Start: 2021-02-08 | End: 2021-02-08

## 2021-02-08 RX ORDER — IMMUNE GLOBULIN INFUSION (HUMAN) 100 MG/ML
30 INJECTION, SOLUTION INTRAVENOUS; SUBCUTANEOUS ONCE
Status: CANCELLED
Start: 2021-02-09 | End: 2021-02-09

## 2021-02-08 RX ORDER — IMMUNE GLOBULIN INFUSION (HUMAN) 100 MG/ML
20 INJECTION, SOLUTION INTRAVENOUS; SUBCUTANEOUS ONCE
Status: COMPLETED | OUTPATIENT
Start: 2021-02-08 | End: 2021-02-08

## 2021-02-08 RX ORDER — IMMUNE GLOBULIN INFUSION (HUMAN) 100 MG/ML
10 INJECTION, SOLUTION INTRAVENOUS; SUBCUTANEOUS ONCE
Status: CANCELLED
Start: 2021-02-09 | End: 2021-02-09

## 2021-02-08 RX ADMIN — IMMUNE GLOBULIN INFUSION (HUMAN) 20 G: 100 INJECTION, SOLUTION INTRAVENOUS; SUBCUTANEOUS at 10:14

## 2021-02-08 RX ADMIN — IMMUNE GLOBULIN INFUSION (HUMAN) 10 G: 100 INJECTION, SOLUTION INTRAVENOUS; SUBCUTANEOUS at 12:37

## 2021-02-08 ASSESSMENT — FIBROSIS 4 INDEX: FIB4 SCORE: 1.14

## 2021-02-08 NOTE — PROGRESS NOTES
Pt arrived ambulatory to [facility] for day 1 of 5 of IVIG.  POC discussed.  Pt educated on new medication.  PIV started to [facility], blood return confirmed.  IVIG titrated per rate sheet without adverse reaction.  PIV flushed and left in place for return use, wrapped with gauze and coban.  Pt discharged from IS in NAD under self care.

## 2021-02-09 ENCOUNTER — OUTPATIENT INFUSION SERVICES (OUTPATIENT)
Dept: ONCOLOGY | Facility: MEDICAL CENTER | Age: 76
End: 2021-02-09
Attending: STUDENT IN AN ORGANIZED HEALTH CARE EDUCATION/TRAINING PROGRAM
Payer: MEDICARE

## 2021-02-09 VITALS
HEART RATE: 52 BPM | TEMPERATURE: 97.3 F | BODY MASS INDEX: 23.19 KG/M2 | OXYGEN SATURATION: 98 % | RESPIRATION RATE: 18 BRPM | SYSTOLIC BLOOD PRESSURE: 149 MMHG | WEIGHT: 153 LBS | DIASTOLIC BLOOD PRESSURE: 65 MMHG | HEIGHT: 68 IN

## 2021-02-09 DIAGNOSIS — G61.81 CIDP (CHRONIC INFLAMMATORY DEMYELINATING POLYNEUROPATHY) (HCC): ICD-10-CM

## 2021-02-09 PROCEDURE — 96366 THER/PROPH/DIAG IV INF ADDON: CPT

## 2021-02-09 PROCEDURE — 700111 HCHG RX REV CODE 636 W/ 250 OVERRIDE (IP): Mod: JG | Performed by: STUDENT IN AN ORGANIZED HEALTH CARE EDUCATION/TRAINING PROGRAM

## 2021-02-09 PROCEDURE — 96365 THER/PROPH/DIAG IV INF INIT: CPT

## 2021-02-09 RX ORDER — IMMUNE GLOBULIN INFUSION (HUMAN) 100 MG/ML
30 INJECTION, SOLUTION INTRAVENOUS; SUBCUTANEOUS ONCE
Status: CANCELLED
Start: 2021-02-10 | End: 2021-02-10

## 2021-02-09 RX ORDER — IMMUNE GLOBULIN INFUSION (HUMAN) 100 MG/ML
30 INJECTION, SOLUTION INTRAVENOUS; SUBCUTANEOUS ONCE
Status: COMPLETED | OUTPATIENT
Start: 2021-02-09 | End: 2021-02-09

## 2021-02-09 RX ADMIN — IMMUNE GLOBULIN INFUSION (HUMAN) 30 G: 100 INJECTION, SOLUTION INTRAVENOUS; SUBCUTANEOUS at 11:56

## 2021-02-09 ASSESSMENT — FIBROSIS 4 INDEX: FIB4 SCORE: 1.14

## 2021-02-09 NOTE — PROGRESS NOTES
Pt presents to [facility] for IVIG infusion. PIV previously established in R forearm; brisk blood return noted and pt tolerated well. IVIG infused per protocol with titration and no s/s of adverse reactions. PIV flushed and brisk blood return observed and then reinforced and wrapped in gauze/coband dressing for tomorrow's apt. Next appointment confirmed and education provided. Pt discharged to self care with all personal belongings and in NAD.

## 2021-02-10 ENCOUNTER — OUTPATIENT INFUSION SERVICES (OUTPATIENT)
Dept: ONCOLOGY | Facility: MEDICAL CENTER | Age: 76
End: 2021-02-10
Attending: STUDENT IN AN ORGANIZED HEALTH CARE EDUCATION/TRAINING PROGRAM
Payer: MEDICARE

## 2021-02-10 VITALS
HEIGHT: 68 IN | BODY MASS INDEX: 23.09 KG/M2 | OXYGEN SATURATION: 98 % | SYSTOLIC BLOOD PRESSURE: 137 MMHG | WEIGHT: 152.34 LBS | HEART RATE: 96 BPM | DIASTOLIC BLOOD PRESSURE: 64 MMHG | RESPIRATION RATE: 18 BRPM | TEMPERATURE: 97.6 F

## 2021-02-10 DIAGNOSIS — G61.81 CIDP (CHRONIC INFLAMMATORY DEMYELINATING POLYNEUROPATHY) (HCC): ICD-10-CM

## 2021-02-10 PROCEDURE — 700111 HCHG RX REV CODE 636 W/ 250 OVERRIDE (IP): Mod: JG | Performed by: STUDENT IN AN ORGANIZED HEALTH CARE EDUCATION/TRAINING PROGRAM

## 2021-02-10 PROCEDURE — 96365 THER/PROPH/DIAG IV INF INIT: CPT

## 2021-02-10 PROCEDURE — 96366 THER/PROPH/DIAG IV INF ADDON: CPT

## 2021-02-10 RX ORDER — IMMUNE GLOBULIN INFUSION (HUMAN) 100 MG/ML
30 INJECTION, SOLUTION INTRAVENOUS; SUBCUTANEOUS ONCE
Status: CANCELLED
Start: 2021-02-11 | End: 2021-02-11

## 2021-02-10 RX ORDER — IMMUNE GLOBULIN INFUSION (HUMAN) 100 MG/ML
30 INJECTION, SOLUTION INTRAVENOUS; SUBCUTANEOUS ONCE
Status: COMPLETED | OUTPATIENT
Start: 2021-02-10 | End: 2021-02-10

## 2021-02-10 RX ADMIN — IMMUNE GLOBULIN INFUSION (HUMAN) 30 G: 100 INJECTION, SOLUTION INTRAVENOUS; SUBCUTANEOUS at 09:11

## 2021-02-10 ASSESSMENT — FIBROSIS 4 INDEX: FIB4 SCORE: 1.14

## 2021-02-10 NOTE — PROGRESS NOTES
Pt arrived ambulatory to [facility] for IVIG. Pt denied fever, signs or symptoms of infection or acute illness today. POC discussed and pt verbalized understanding.     PIV in place; flushes well with brisk, positive blood return noted.  IVIG administered per MAR; rate started at subsequent/max rate of 140ml/hr per calculator sheet and pt tolerated well.  No signs or symptoms of reaction or complications noted. PIV flushed, blood return noted, orange caps applied and line secured in coban for tomorrow's use.    Follow-up care discussed and tomorrow's appointment confirmed with pt. Pt discharged home to self care in no apparent distress at this time.

## 2021-02-11 ENCOUNTER — OUTPATIENT INFUSION SERVICES (OUTPATIENT)
Dept: ONCOLOGY | Facility: MEDICAL CENTER | Age: 76
End: 2021-02-11
Attending: STUDENT IN AN ORGANIZED HEALTH CARE EDUCATION/TRAINING PROGRAM
Payer: MEDICARE

## 2021-02-11 VITALS
SYSTOLIC BLOOD PRESSURE: 143 MMHG | TEMPERATURE: 98.3 F | BODY MASS INDEX: 23.36 KG/M2 | WEIGHT: 154.1 LBS | DIASTOLIC BLOOD PRESSURE: 58 MMHG | HEART RATE: 63 BPM | OXYGEN SATURATION: 97 % | RESPIRATION RATE: 18 BRPM | HEIGHT: 68 IN

## 2021-02-11 DIAGNOSIS — G61.81 CIDP (CHRONIC INFLAMMATORY DEMYELINATING POLYNEUROPATHY) (HCC): ICD-10-CM

## 2021-02-11 PROCEDURE — 96365 THER/PROPH/DIAG IV INF INIT: CPT

## 2021-02-11 PROCEDURE — 96366 THER/PROPH/DIAG IV INF ADDON: CPT

## 2021-02-11 PROCEDURE — 700111 HCHG RX REV CODE 636 W/ 250 OVERRIDE (IP): Mod: JG | Performed by: STUDENT IN AN ORGANIZED HEALTH CARE EDUCATION/TRAINING PROGRAM

## 2021-02-11 RX ORDER — IMMUNE GLOBULIN INFUSION (HUMAN) 100 MG/ML
30 INJECTION, SOLUTION INTRAVENOUS; SUBCUTANEOUS ONCE
Status: COMPLETED | OUTPATIENT
Start: 2021-02-11 | End: 2021-02-11

## 2021-02-11 RX ORDER — IMMUNE GLOBULIN INFUSION (HUMAN) 100 MG/ML
30 INJECTION, SOLUTION INTRAVENOUS; SUBCUTANEOUS ONCE
Status: CANCELLED
Start: 2021-02-12 | End: 2021-02-12

## 2021-02-11 RX ADMIN — IMMUNE GLOBULIN INFUSION (HUMAN) 30 G: 100 INJECTION, SOLUTION INTRAVENOUS; SUBCUTANEOUS at 14:07

## 2021-02-11 ASSESSMENT — FIBROSIS 4 INDEX: FIB4 SCORE: 1.14

## 2021-02-11 NOTE — PROGRESS NOTES
Patient here for Day 4/5 IVIG. Reports mild headache this morning; educated patient the importance of hydration. PIV in place. PIV flushed with normal saline and PIV was infiltrated. PIV removed; gauze/coban applied to site. New PIV established; brisk blood return noted. IVIG given per MAR started at max rate 140 ml/hr. IVIG completed. PIV flushed with normal saline. PIV saline-locked for tomorrow's appointment; secured PIV with gauze/coban. Next appointment scheduled. Discharged to self care; no apparent distress noted.

## 2021-02-12 ENCOUNTER — OUTPATIENT INFUSION SERVICES (OUTPATIENT)
Dept: ONCOLOGY | Facility: MEDICAL CENTER | Age: 76
End: 2021-02-12
Attending: STUDENT IN AN ORGANIZED HEALTH CARE EDUCATION/TRAINING PROGRAM
Payer: MEDICARE

## 2021-02-12 VITALS
BODY MASS INDEX: 23.42 KG/M2 | HEIGHT: 68 IN | RESPIRATION RATE: 18 BRPM | HEART RATE: 61 BPM | DIASTOLIC BLOOD PRESSURE: 58 MMHG | TEMPERATURE: 97.6 F | WEIGHT: 154.54 LBS | SYSTOLIC BLOOD PRESSURE: 145 MMHG | OXYGEN SATURATION: 97 %

## 2021-02-12 DIAGNOSIS — G61.81 CIDP (CHRONIC INFLAMMATORY DEMYELINATING POLYNEUROPATHY) (HCC): ICD-10-CM

## 2021-02-12 PROCEDURE — 96365 THER/PROPH/DIAG IV INF INIT: CPT

## 2021-02-12 PROCEDURE — 700111 HCHG RX REV CODE 636 W/ 250 OVERRIDE (IP): Mod: JG | Performed by: STUDENT IN AN ORGANIZED HEALTH CARE EDUCATION/TRAINING PROGRAM

## 2021-02-12 PROCEDURE — 96366 THER/PROPH/DIAG IV INF ADDON: CPT

## 2021-02-12 RX ORDER — IMMUNE GLOBULIN INFUSION (HUMAN) 100 MG/ML
30 INJECTION, SOLUTION INTRAVENOUS; SUBCUTANEOUS ONCE
Status: COMPLETED | OUTPATIENT
Start: 2021-02-12 | End: 2021-02-12

## 2021-02-12 RX ORDER — IMMUNE GLOBULIN INFUSION (HUMAN) 100 MG/ML
30 INJECTION, SOLUTION INTRAVENOUS; SUBCUTANEOUS ONCE
Status: CANCELLED
Start: 2021-02-13 | End: 2021-02-13

## 2021-02-12 RX ADMIN — IMMUNE GLOBULIN INFUSION (HUMAN) 30 G: 100 INJECTION, SOLUTION INTRAVENOUS; SUBCUTANEOUS at 14:07

## 2021-02-12 ASSESSMENT — FIBROSIS 4 INDEX: FIB4 SCORE: 1.14

## 2021-02-12 NOTE — PROGRESS NOTES
Pt returns for day 5 of 5 of IVIG.  Pt reports no adverse effects from IVIG.  PIV to L-FA was kept in place over night.  PIV flushed and site has infiltrated.  New PIV established to L-AC.  IVIG started at max rate of 140ml/hr per pt's preference.  IVIG completed without adverse reaction.  PIV flushed and site removed.  Site wrapped with pressure dressing.  Pt dc home to self care.

## 2021-04-13 ENCOUNTER — HOSPITAL ENCOUNTER (OUTPATIENT)
Dept: LAB | Facility: MEDICAL CENTER | Age: 76
End: 2021-04-13
Attending: STUDENT IN AN ORGANIZED HEALTH CARE EDUCATION/TRAINING PROGRAM
Payer: MEDICARE

## 2021-04-13 ENCOUNTER — OFFICE VISIT (OUTPATIENT)
Dept: NEUROLOGY | Facility: MEDICAL CENTER | Age: 76
End: 2021-04-13
Attending: STUDENT IN AN ORGANIZED HEALTH CARE EDUCATION/TRAINING PROGRAM
Payer: MEDICARE

## 2021-04-13 VITALS
SYSTOLIC BLOOD PRESSURE: 124 MMHG | BODY MASS INDEX: 22.07 KG/M2 | TEMPERATURE: 98.4 F | DIASTOLIC BLOOD PRESSURE: 80 MMHG | HEIGHT: 69 IN | HEART RATE: 54 BPM | OXYGEN SATURATION: 98 % | WEIGHT: 149 LBS

## 2021-04-13 DIAGNOSIS — G62.9 POLYNEUROPATHY: ICD-10-CM

## 2021-04-13 DIAGNOSIS — G62.9 PERIPHERAL POLYNEUROPATHY: ICD-10-CM

## 2021-04-13 DIAGNOSIS — R73.03 PREDIABETES: ICD-10-CM

## 2021-04-13 DIAGNOSIS — G61.81 DADS (DISTAL ACQUIRED DEMYELINATING SYMMETRIC NEUROPATHY) (HCC): ICD-10-CM

## 2021-04-13 LAB
EST. AVERAGE GLUCOSE BLD GHB EST-MCNC: 94 MG/DL
HBA1C MFR BLD: 4.9 % (ref 4–5.6)

## 2021-04-13 PROCEDURE — 36415 COLL VENOUS BLD VENIPUNCTURE: CPT | Mod: GA

## 2021-04-13 PROCEDURE — 83036 HEMOGLOBIN GLYCOSYLATED A1C: CPT | Mod: GA

## 2021-04-13 PROCEDURE — 99215 OFFICE O/P EST HI 40 MIN: CPT | Performed by: STUDENT IN AN ORGANIZED HEALTH CARE EDUCATION/TRAINING PROGRAM

## 2021-04-13 PROCEDURE — 99211 OFF/OP EST MAY X REQ PHY/QHP: CPT | Performed by: STUDENT IN AN ORGANIZED HEALTH CARE EDUCATION/TRAINING PROGRAM

## 2021-04-13 ASSESSMENT — FIBROSIS 4 INDEX: FIB4 SCORE: 1.14

## 2021-04-13 NOTE — PROGRESS NOTES
GENERAL NEUROLOGY CLINIC INITIAL ENCOUNTER  CHIEF COMPLAINT(S): CIDP    History of present illness:  Lei Isaacs 75 y.o. male presents today for polyneuropathy.  His clinical course, electrodiagnostic testing, and lumbar puncture is suggestive of CIDP.  He reports occasional bouts of exacerbation of numbness particularly in his legs and feet.  He currently feels like there is been progression of his symptoms since he was last seen in our clinic several months ago.  Specifically he notes numbness and dysesthesias in the bottom of his feet and the back of his legs.  He feels like his knees buckle.  He has not fallen.  No issues with eating/chewing/swallowing.  No shortness of breath.  No fever or chills.  No double vision.        Patient's PMH, PSH, FH, and SH were reviewed.    Medications and allergies were reviewed.    INTERVAL HISTORY:  Started IVIG for 5 days which he overall tolerated well. He did end up suffering from a collapsed vein and/or superficial venous clot at the infusion site. He said he felt a palpable cord. This has since resolved.    Also reported an itchy rash in the bilateral cubital fossa and right mid back spanning multiple dermatomes. This too has largely cleared. Rash seemed to have erupted several days after the infusion. It wasn't immediate.    As for the efficacy of the IVIG he did notice some improvement in the numbness and paresthesias, particularly in the legs ter the IVIG. Also noticed improvement in energy transiently for a week or two. Unfortunately, he could not afford to undergo more treatments as his insurance only pay 80% of the treatment.    Overall, he remains clinically stable.    Labs again reviewed. Notable for past history of elevated A1c in the prediabetes range. +GD1a antibodies. Elevated protein in CSF. 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       Frandy Cruz MD   Medical Director, Epilepsy and Neurodiagnostics.   Clinical  of Neurology [facility].   Diplomate in Neurology, Epilepsy, and Electrodiagnostic Medicine.     Past medical history:   Past Medical History:   Diagnosis Date   • Arrhythmia     palpitations, increased heart beat on occasion       Past surgical history:   Past Surgical History:   Procedure Laterality Date   • CARPAL TUNNEL RELEASE  10/25/2013    Performed by Lauro Rossi M.D. at SURGERY SAME DAY [facility]   • NERVE ULNAR TRANSFER  10/25/2013    Performed by Lauro Rossi M.D. at SURGERY SAME DAY [facility]   • EPICONDYLAR STRIPPING  10/25/2013    Performed by Lauro Rossi M.D. at SURGERY SAME DAY [facility]   • CARPAL TUNNEL RELEASE      right       Family history:   History reviewed. No pertinent family history.    Social history:   Social History     Socioeconomic History   • Marital status: Single     Spouse name: Not on file   • Number of children: Not on file   • Years of education: Not on file   • Highest education level: Not on file   Occupational History   • Not on file   Tobacco Use   • Smoking status: Never Smoker   • Smokeless tobacco: Never Used   Substance and Sexual Activity   • Alcohol use: Yes     Comment: 1 beer a day occassionally   • Drug use: No   • Sexual activity: Not on file   Other Topics Concern   • Not on file   Social History Narrative   • Not on file     Social Determinants of Health     Financial Resource Strain:    • Difficulty of Paying Living Expenses:    Food Insecurity:    • Worried About  Running Out of Food in the Last Year:    • Ran Out of Food in the Last Year:    Transportation Needs:    • Lack of Transportation (Medical):    • Lack of Transportation (Non-Medical):    Physical Activity:    • Days of Exercise per Week:    • Minutes of Exercise per Session:    Stress:    • Feeling of Stress :    Social Connections:    • Frequency of Communication with Friends and Family:    • Frequency of Social Gatherings with Friends and Family:    • Attends [employer]arsani Services:    • Active Member of Clubs or Organizations:    • Attends Club or Organization Meetings:    • Marital Status:    Intimate Partner Violence:    • Fear of Current or Ex-Partner:    • Emotionally Abused:    • Physically Abused:    • Sexually Abused:        Current medications:   Current Outpatient Medications   Medication   • VITAMIN K PO   • Cyanocobalamin (B-12 PO)   • vitamin D3, cholecalciferol, 400 UNIT Tab tablet   • KRILL OIL PO   • aspirin EC (ECOTRIN) 81 MG Tablet Delayed Response   • Garlic TABS     No current facility-administered medications for this visit.       Medication Allergy:  Allergies   Allergen Reactions   • Flu Virus Vaccine Itching and Runny Nose   • Seasonal          Review of systems:   Pertinent positives and negatives are as outlined above      Physical examination:   Vitals:    04/13/21 1114   BP: 124/80   Pulse: (!) 54   Temp: 36.9 °C (98.4 °F)   SpO2: 98%       General: Patient in no acute distress, pleasant and cooperative.  HEENT: Normocephalic, no signs of acute trauma.   Neck: appears supple, here is normal range of motion. No tenderness on exam.   Chest: clear to auscultation. Symmetrical chest rise with inhalation. No cough.   CV: RRR, no murmurs.   Skin: no signs of acute rashes or trauma.   Musculoskeletal: joints exhibit full range of motion. There are no signs of joint or muscle swelling.   Psychiatric: pertinent positives as discussed above    NEUROLOGICAL EXAM:   Mental status:  orientation: Awake,  alert and oriented to self, month, year, situation.  Attention: Intact  Visio-spatial testing: NT  Frontal release signs: Absent  Speech and language: speech is clear and fluent. The patient is able to name, repeat and comprehend. No impairment in fluency. No word substitions or paraphasic errors  Memory: There is intact recollection of recent and remote events.   Cranial nerve exam:   I: smell Not tested   II: visual acuity  OS: NT   OD: NT   II: visual fields Full to confrontation  Visual neglect: absent   II: pupils Equal, round, reactive to light   III,VII: ptosis None   III,IV,VI: extraocular muscles  Full ROM   V: mastication Normal   V: facial light touch sensation  Normal   V,VII: corneal reflex  Not tested   VII: facial muscle function - upper  Normal   VII: facial muscle function - lower Normal   VIII: hearing Not tested   IX: soft palate elevation  Normal   IX,X: gag reflex Not tested   XI: trapezius strength  5/5   XI: sternocleidomastoid strength 5/5   XI: neck flexion strength  5/5         Motor exam:   • He has bilateral atrophy of the thenar and hyperthenar eminences.  Mild diffuse weakness in the distal musculature in the upper extremity.  4+ out of 5 foot plantar flexor weakness and toe dorsiflexor weakness and ankle dorsiflexion weakness.  Otherwise 5 out of 5 throughout in the lower extremity  • Tone is normal.  • No abnormal movements were seen on exam.   • No muscle fasciculation  • No areas of atrophy  •   Sensory exam mild to moderate sensory loss in the hands and feet to vibration  Deep tendon reflexes:  1+ throughout in b/l UE. 2+ patella and hamstring. Trace ankle jerk. Equivocal babinski bilaterally. .   Coordination: shows a normal finger-nose-finger. Normal rapidly alternating movements. Heel-knee-shin movements smooth and coordinated bilaterally.   Gait:   • The patient was able to get up from seated position on first attempt without requiring assistance.   • Found to be steady when  walking.   • Has difficulty with toe walking and heel walking particularly on the right.  • Movements were fluid with normal arm swing.   • The patient was able to turn without difficulties or tendency to fall.           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  Patient was started on IVIG but unable to afford further treatments as insurance only covers 80% of the cost.  Had an extensive discussion about alternatives such as steroid, steroid sparing treatments.  Also discussed plasmapheresis.  We will look into options that are affordable for him.  We will follow up in about 4 to 6 weeks.  Patient wants to hold off on steroids which is reasonable to do given its multiple well-known side effects.  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  These can be associated with antimag as well as monoclonal gammopathy.  Therefore we will check for this.  Recommend that the patient continues exercising and resistance training.        Visit Diagnoses     ICD-10-CM   1. Polyneuropathy  G62.9   2. Peripheral polyneuropathy  G62.9   3. Prediabetes   R73.03   4. DADS (distal acquired demyelinating symmetric neuropathy) ([facility])  G61.81        Orders Placed This Encounter   • HEMOGLOBIN A1C   • SPEP W/REFLEX TO JOAQUÍN, A, G, M   • Free Light Chains, Qnt, Ur   • PROTEIN ELECTROPHORESIS, UR   • MAG ANTIBODY SERUM   • VITAMIN K PO            FOLLOW-UP:   4-6 weeks            BILLING DOCUMENTATION:       Counseling:  I spent a total of 43 minutes of face-to-face time in this visit. Over 50% of the time of the visit today was spent on counseling and or coordination of care wtih the patient and/or family, as above in assessment in  colleen.       Fabrizio Landaverde MD  Epilepsy and General Neurology  Department of Neurology  Clinical  of Neurology [facility].

## 2021-05-19 ENCOUNTER — OFFICE VISIT (OUTPATIENT)
Dept: NEUROLOGY | Facility: MEDICAL CENTER | Age: 76
End: 2021-05-19
Attending: STUDENT IN AN ORGANIZED HEALTH CARE EDUCATION/TRAINING PROGRAM
Payer: MEDICARE

## 2021-05-19 VITALS
HEART RATE: 70 BPM | DIASTOLIC BLOOD PRESSURE: 80 MMHG | OXYGEN SATURATION: 99 % | SYSTOLIC BLOOD PRESSURE: 118 MMHG | TEMPERATURE: 97.8 F

## 2021-05-19 DIAGNOSIS — G61.81 CIDP (CHRONIC INFLAMMATORY DEMYELINATING POLYNEUROPATHY) (HCC): Primary | ICD-10-CM

## 2021-05-19 DIAGNOSIS — G62.89 PERIPHERAL MOTOR NEUROPATHY: ICD-10-CM

## 2021-05-19 DIAGNOSIS — Z00.00 HEALTHCARE MAINTENANCE: ICD-10-CM

## 2021-05-19 DIAGNOSIS — G56.00 CARPAL TUNNEL SYNDROME, UNSPECIFIED LATERALITY: ICD-10-CM

## 2021-05-19 DIAGNOSIS — G61.82: ICD-10-CM

## 2021-05-19 PROCEDURE — 99211 OFF/OP EST MAY X REQ PHY/QHP: CPT | Performed by: STUDENT IN AN ORGANIZED HEALTH CARE EDUCATION/TRAINING PROGRAM

## 2021-05-19 PROCEDURE — 99215 OFFICE O/P EST HI 40 MIN: CPT | Performed by: STUDENT IN AN ORGANIZED HEALTH CARE EDUCATION/TRAINING PROGRAM

## 2021-05-19 RX ORDER — 0.9 % SODIUM CHLORIDE 0.9 %
3 VIAL (ML) INJECTION PRN
Status: CANCELLED | OUTPATIENT
Start: 2021-05-19

## 2021-05-19 RX ORDER — ONDANSETRON 4 MG/1
4 TABLET, ORALLY DISINTEGRATING ORAL EVERY 4 HOURS PRN
Status: CANCELLED | OUTPATIENT
Start: 2021-05-19

## 2021-05-19 RX ORDER — 0.9 % SODIUM CHLORIDE 0.9 %
10 VIAL (ML) INJECTION PRN
Status: CANCELLED | OUTPATIENT
Start: 2021-05-19

## 2021-05-19 RX ORDER — METHYLPREDNISOLONE SODIUM SUCCINATE 125 MG/2ML
60 INJECTION, POWDER, LYOPHILIZED, FOR SOLUTION INTRAMUSCULAR; INTRAVENOUS ONCE
Status: CANCELLED | OUTPATIENT
Start: 2021-05-19 | End: 2021-05-19

## 2021-05-19 RX ORDER — HEPARIN SODIUM (PORCINE) LOCK FLUSH IV SOLN 100 UNIT/ML 100 UNIT/ML
500 SOLUTION INTRAVENOUS PRN
Status: CANCELLED | OUTPATIENT
Start: 2021-05-19

## 2021-05-19 RX ORDER — ACETAMINOPHEN 325 MG/1
650 TABLET ORAL ONCE
Status: CANCELLED | OUTPATIENT
Start: 2021-05-19

## 2021-05-19 RX ORDER — 0.9 % SODIUM CHLORIDE 0.9 %
VIAL (ML) INJECTION PRN
Status: CANCELLED | OUTPATIENT
Start: 2021-05-19

## 2021-05-19 RX ORDER — SODIUM CHLORIDE 9 MG/ML
INJECTION, SOLUTION INTRAVENOUS CONTINUOUS
Status: CANCELLED | OUTPATIENT
Start: 2021-05-19

## 2021-05-19 RX ORDER — MEPERIDINE HYDROCHLORIDE 25 MG/ML
25 INJECTION INTRAMUSCULAR; INTRAVENOUS; SUBCUTANEOUS PRN
Status: CANCELLED | OUTPATIENT
Start: 2021-05-19 | End: 2021-05-21

## 2021-05-19 RX ORDER — DIPHENHYDRAMINE HYDROCHLORIDE 50 MG/ML
25 INJECTION INTRAMUSCULAR; INTRAVENOUS PRN
Status: CANCELLED | OUTPATIENT
Start: 2021-05-19 | End: 2021-05-21

## 2021-05-19 RX ORDER — ACETAMINOPHEN 325 MG/1
650 TABLET ORAL PRN
Status: CANCELLED | OUTPATIENT
Start: 2021-05-19 | End: 2021-05-21

## 2021-05-19 NOTE — PROGRESS NOTES
GENERAL NEUROLOGY FOLLOW-UP  CHIEF COMPLAINT(S): CIDP    History of present illness:  Lei Isaacs 75 y.o. male presents today for polyneuropathy.  His clinical course, electrodiagnostic testing, and lumbar puncture is suggestive of CIDP.  He reports occasional bouts of exacerbation of numbness particularly in his legs and feet.  He currently feels like there is been progression of his symptoms since he was last seen in our clinic several months ago.  Specifically he notes numbness and dysesthesias in the bottom of his feet and the back of his legs.  He feels like his knees buckle.  He has not fallen.  No issues with eating/chewing/swallowing.  No shortness of breath.  No fever or chills.  No double vision.        Patient's PMH, PSH, FH, and SH were reviewed.    Medications and allergies were reviewed.    INTERVAL HISTORY:  Started IVIG for 5 days which he overall tolerated well. He did end up suffering from a collapsed vein and/or superficial venous clot at the infusion site. He said he felt a palpable cord. This has since resolved.    Also reported an itchy rash in the bilateral cubital fossa and right mid back spanning multiple dermatomes. This too has largely cleared. Rash seemed to have erupted several days after the infusion. It wasn't immediate.    As for the efficacy of the IVIG he did notice some improvement in the numbness and paresthesias, particularly in the legs ter the IVIG. Also noticed improvement in energy transiently for a week or two. Unfortunately, he could not afford to undergo more treatments as his insurance only pay 80% of the treatment.    Overall, he remains clinically stable.    Labs again reviewed. Notable for past history of elevated A1c in the prediabetes range. +GD1a antibodies. Elevated protein in CSF. 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       Frandy Cruz MD   Medical Director, Epilepsy and Neurodiagnostics.   Clinical  of Neurology [facility].   Diplomate in Neurology, Epilepsy, and Electrodiagnostic Medicine.         Past medical history:   Past Medical History:   Diagnosis Date   • Arrhythmia     palpitations, increased heart beat on occasion       Past surgical history:   Past Surgical History:   Procedure Laterality Date   • CARPAL TUNNEL RELEASE  10/25/2013    Performed by Lauro Rossi M.D. at SURGERY [facility] DAY [facility] ORS   • NERVE ULNAR TRANSFER  10/25/2013    Performed by Lauro Rossi M.D. at [facility]GERY [facility] DAY [facility] ORS   • EPICONDYLAR STRIPPING  10/25/2013    Performed by Lauro Rossi M.D. at SURGERY [facility] DAY [facility] ORS   • CARPAL TUNNEL RELEASE      right       Family history:   History reviewed. No pertinent family history.    Social history:     Current medications:   Current Outpatient Medications   Medication   • VITAMIN K PO   • Cyanocobalamin (B-12 PO)   • vitamin D3, cholecalciferol, 400 UNIT Tab tablet   • KRILL OIL PO   • aspirin EC (ECOTRIN) 81 MG Tablet Delayed Response   • Garlic TABS     No current facility-administered medications for this visit.       Medication Allergy:  Allergies   Allergen Reactions   • Flu Virus Vaccine Itching and Runny Nose   • Seasonal          Review of systems:   Pertinent positives and negatives are as outlined above      Physical examination:   Vitals:    05/19/21 0722   BP: 118/80   Pulse: 70   Temp: 36.6 °C (97.8 °F)   SpO2: 99%       General: Patient in no acute distress, pleasant and  cooperative.  HEENT: Normocephalic, no signs of acute trauma.   Neck: appears supple, here is normal range of motion. No tenderness on exam.   Chest: clear to auscultation. Symmetrical chest rise with inhalation. No cough.   CV: RRR, no murmurs.   Skin: no signs of acute rashes or trauma.   Musculoskeletal: joints exhibit full range of motion. There are no signs of joint or muscle swelling.   Psychiatric: pertinent positives as discussed above    NEUROLOGICAL EXAM:   Mental status:  orientation: Awake, alert and oriented to self, month, year, situation.  Attention: Intact  Visio-spatial testing: NT  Frontal release signs: Absent  Speech and language: speech is clear and fluent. The patient is able to name, repeat and comprehend. No impairment in fluency. No word substitions or paraphasic errors  Memory: There is intact recollection of recent and remote events.   Cranial nerve exam:   I: smell Not tested   II: visual acuity  OS: NT   OD: NT   II: visual fields Full to confrontation  Visual neglect: absent   II: pupils Equal, round, reactive to light   III,VII: ptosis None   III,IV,VI: extraocular muscles  Full ROM   V: mastication Normal   V: facial light touch sensation  Normal   V,VII: corneal reflex  Not tested   VII: facial muscle function - upper  Normal   VII: facial muscle function - lower Normal   VIII: hearing Not tested   IX: soft palate elevation  Normal   IX,X: gag reflex Not tested   XI: trapezius strength  5/5   XI: sternocleidomastoid strength 5/5   XI: neck flexion strength  5/5         Motor exam:   • He has bilateral atrophy of the thenar and hyperthenar eminences. 4/5 finger flexor and extenor eakness on R, 4+ on left. 4/5 thumb extensor and flexor weakness on R, 4+ on L. 4+ right bicep. 4+/5  Right foot dorsiflexor weakness. Otherwise 5 out of 5 throughout in the lower extremity  • Tone is normal.  • No abnormal movements were seen on exam.   • No muscle fasciculation  • Diffuse atrophy in the  distal hands on the dorsum and palmar regions  •   Sensory exam mild to moderate sensory loss in the hands and feet to vibration  Deep tendon reflexes:  Absent b/l AJ, . 2+ patella, trace  In bi, BR, tri.Equivocal babinski bilaterally. .   Coordination: shows a normal finger-nose-finger. Normal rapidly alternating movements. Heel-knee-shin movements smooth and coordinated bilaterally.   Gait:   • The patient was able to get up from seated position on first attempt without requiring assistance.   • Found to be steady when walking.   • Has difficulty with toe walking and heel walking particularly on the right.  • Movements were fluid with normal arm swing.   • The patient was able to turn without difficulties or tendency to fall.           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 Patient weakness has mildly progressed since last visit as reflected by greater R dital weakness and now R bicep weakness. 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 IVIG too costly for patient, even though he had a subjective improvement in strength. Does no want to pursue chronic oral steroid use or plasmapharesis due to side effects and risks. Also discussed cellcept and aziothioprine as options, their side effects, and potential benefit. Discussed rituximab. Will start this treatment q6 months. Will need labs prior to stating infusion        Visit Diagnoses     ICD-10-CM   1. CIDP (chronic inflammatory demyelinating polyneuropathy) ([facility])  G61.81   2. Neuropathy with IgM anti-GD1a antibodies ([facility])  G61.82   3. Peripheral motor neuropathy  G62.89   4. Carpal tunnel syndrome, unspecified laterality  G56.00   5. Healthcare maintenance  Z00.00        Starting rituxumab. Treatment plan signed      FOLLOW-UP:    4-6 weeks            BILLING DOCUMENTATION:       Counseling:  I spent a total of 50 minutes of face-to-face time in this visit. Over 50% of the time of the visit today was spent on counseling and or coordination of care wtih the patient and/or family, as above in assessment in plan.       Fabrizio Landaverde MD  Epilepsy and General Neurology  Department of Neurology  Clinical  of Neurology [facility].

## 2021-05-24 RX ORDER — 0.9 % SODIUM CHLORIDE 0.9 %
10 VIAL (ML) INJECTION PRN
Status: CANCELLED | OUTPATIENT
Start: 2021-05-24

## 2021-05-24 RX ORDER — METHYLPREDNISOLONE SODIUM SUCCINATE 125 MG/2ML
125 INJECTION, POWDER, LYOPHILIZED, FOR SOLUTION INTRAMUSCULAR; INTRAVENOUS PRN
Status: CANCELLED | OUTPATIENT
Start: 2021-05-24

## 2021-05-24 RX ORDER — SODIUM CHLORIDE 9 MG/ML
INJECTION, SOLUTION INTRAVENOUS CONTINUOUS
Status: CANCELLED | OUTPATIENT
Start: 2021-05-24

## 2021-05-24 RX ORDER — DIPHENHYDRAMINE HYDROCHLORIDE 50 MG/ML
50 INJECTION INTRAMUSCULAR; INTRAVENOUS PRN
Status: CANCELLED | OUTPATIENT
Start: 2021-05-24

## 2021-05-24 RX ORDER — EPINEPHRINE 1 MG/ML(1)
0.5 AMPUL (ML) INJECTION PRN
Status: CANCELLED | OUTPATIENT
Start: 2021-05-24

## 2021-05-24 RX ORDER — HEPARIN SODIUM (PORCINE) LOCK FLUSH IV SOLN 100 UNIT/ML 100 UNIT/ML
500 SOLUTION INTRAVENOUS PRN
Status: CANCELLED | OUTPATIENT
Start: 2021-05-24

## 2021-05-24 RX ORDER — 0.9 % SODIUM CHLORIDE 0.9 %
3 VIAL (ML) INJECTION PRN
Status: CANCELLED | OUTPATIENT
Start: 2021-05-24

## 2021-05-24 RX ORDER — 0.9 % SODIUM CHLORIDE 0.9 %
VIAL (ML) INJECTION PRN
Status: CANCELLED | OUTPATIENT
Start: 2021-05-24

## 2021-05-24 RX ORDER — DIPHENHYDRAMINE HCL 25 MG
50 TABLET ORAL ONCE
Status: CANCELLED | OUTPATIENT
Start: 2021-05-24 | End: 2021-05-24

## 2021-05-24 RX ORDER — ACETAMINOPHEN 325 MG/1
650 TABLET ORAL ONCE
Status: CANCELLED | OUTPATIENT
Start: 2021-05-24 | End: 2021-05-24

## 2021-05-26 ENCOUNTER — OUTPATIENT INFUSION SERVICES (OUTPATIENT)
Dept: ONCOLOGY | Facility: MEDICAL CENTER | Age: 76
End: 2021-05-26
Attending: STUDENT IN AN ORGANIZED HEALTH CARE EDUCATION/TRAINING PROGRAM
Payer: MEDICARE

## 2021-05-26 ENCOUNTER — TELEPHONE (OUTPATIENT)
Dept: NEUROLOGY | Facility: MEDICAL CENTER | Age: 76
End: 2021-05-26

## 2021-05-26 VITALS
RESPIRATION RATE: 18 BRPM | SYSTOLIC BLOOD PRESSURE: 146 MMHG | WEIGHT: 147.71 LBS | OXYGEN SATURATION: 98 % | HEIGHT: 68 IN | DIASTOLIC BLOOD PRESSURE: 62 MMHG | BODY MASS INDEX: 22.39 KG/M2 | TEMPERATURE: 98 F | HEART RATE: 58 BPM

## 2021-05-26 DIAGNOSIS — G61.81 CIDP (CHRONIC INFLAMMATORY DEMYELINATING POLYNEUROPATHY) (HCC): ICD-10-CM

## 2021-05-26 PROCEDURE — 700111 HCHG RX REV CODE 636 W/ 250 OVERRIDE (IP): Mod: JG | Performed by: STUDENT IN AN ORGANIZED HEALTH CARE EDUCATION/TRAINING PROGRAM

## 2021-05-26 PROCEDURE — 96366 THER/PROPH/DIAG IV INF ADDON: CPT

## 2021-05-26 PROCEDURE — 96365 THER/PROPH/DIAG IV INF INIT: CPT

## 2021-05-26 RX ORDER — DIPHENHYDRAMINE HCL 25 MG
50 TABLET ORAL ONCE
Status: DISCONTINUED | OUTPATIENT
Start: 2021-05-26 | End: 2021-05-26 | Stop reason: HOSPADM

## 2021-05-26 RX ORDER — 0.9 % SODIUM CHLORIDE 0.9 %
10 VIAL (ML) INJECTION PRN
Status: CANCELLED | OUTPATIENT
Start: 2021-05-26

## 2021-05-26 RX ORDER — SODIUM CHLORIDE 9 MG/ML
INJECTION, SOLUTION INTRAVENOUS CONTINUOUS
Status: CANCELLED | OUTPATIENT
Start: 2021-05-26

## 2021-05-26 RX ORDER — EPINEPHRINE 1 MG/ML(1)
0.5 AMPUL (ML) INJECTION PRN
Status: CANCELLED | OUTPATIENT
Start: 2021-05-26

## 2021-05-26 RX ORDER — ACETAMINOPHEN 325 MG/1
650 TABLET ORAL ONCE
Status: CANCELLED | OUTPATIENT
Start: 2021-05-26 | End: 2021-05-26

## 2021-05-26 RX ORDER — DIPHENHYDRAMINE HCL 25 MG
50 TABLET ORAL ONCE
Status: CANCELLED | OUTPATIENT
Start: 2021-05-26 | End: 2021-05-26

## 2021-05-26 RX ORDER — SODIUM CHLORIDE 9 MG/ML
INJECTION, SOLUTION INTRAVENOUS CONTINUOUS
Status: DISCONTINUED | OUTPATIENT
Start: 2021-05-26 | End: 2021-05-26 | Stop reason: HOSPADM

## 2021-05-26 RX ORDER — 0.9 % SODIUM CHLORIDE 0.9 %
VIAL (ML) INJECTION PRN
Status: CANCELLED | OUTPATIENT
Start: 2021-05-26

## 2021-05-26 RX ORDER — HEPARIN SODIUM (PORCINE) LOCK FLUSH IV SOLN 100 UNIT/ML 100 UNIT/ML
500 SOLUTION INTRAVENOUS PRN
Status: CANCELLED | OUTPATIENT
Start: 2021-05-26

## 2021-05-26 RX ORDER — 0.9 % SODIUM CHLORIDE 0.9 %
3 VIAL (ML) INJECTION PRN
Status: CANCELLED | OUTPATIENT
Start: 2021-05-26

## 2021-05-26 RX ORDER — METHYLPREDNISOLONE SODIUM SUCCINATE 125 MG/2ML
125 INJECTION, POWDER, LYOPHILIZED, FOR SOLUTION INTRAMUSCULAR; INTRAVENOUS PRN
Status: CANCELLED | OUTPATIENT
Start: 2021-05-26

## 2021-05-26 RX ORDER — DIPHENHYDRAMINE HYDROCHLORIDE 50 MG/ML
50 INJECTION INTRAMUSCULAR; INTRAVENOUS PRN
Status: CANCELLED | OUTPATIENT
Start: 2021-05-26

## 2021-05-26 RX ORDER — ACETAMINOPHEN 325 MG/1
650 TABLET ORAL ONCE
Status: DISCONTINUED | OUTPATIENT
Start: 2021-05-26 | End: 2021-05-26 | Stop reason: HOSPADM

## 2021-05-26 RX ADMIN — IMMUNE GLOBULIN INFUSION (HUMAN) 70 G: 100 INJECTION, SOLUTION INTRAVENOUS; SUBCUTANEOUS at 10:56

## 2021-05-26 ASSESSMENT — FIBROSIS 4 INDEX: FIB4 SCORE: 1.14

## 2021-05-26 NOTE — PROGRESS NOTES
Pt presents to [facility] for IVIG infusion. PIV established in L forearm; brisk blood return noted and pt tolerated well. IVIG infused per protocol with titration and no s/s of adverse reactions. PIV flushed and brisk blood return observed and then removed; gauze/coband dressing applied. Next appointment confirmed and education provided. Pt discharged to self care with all personal belongings and in NAD.

## 2021-05-26 NOTE — TELEPHONE ENCOUNTER
"Patient lvm stating that you recently prescribed a medication to him, he didn't specify which one but it may be rituxumab. Pt stated the medication has \"harsh chemicals\" in ti and does not want to take it. And is requesting to be switched back to the ivig. Please advise.    Susan wilkinson  "

## 2021-06-23 ENCOUNTER — OUTPATIENT INFUSION SERVICES (OUTPATIENT)
Dept: ONCOLOGY | Facility: MEDICAL CENTER | Age: 76
End: 2021-06-23
Attending: STUDENT IN AN ORGANIZED HEALTH CARE EDUCATION/TRAINING PROGRAM
Payer: MEDICARE

## 2021-06-23 VITALS
WEIGHT: 147.27 LBS | DIASTOLIC BLOOD PRESSURE: 51 MMHG | TEMPERATURE: 97.4 F | OXYGEN SATURATION: 97 % | BODY MASS INDEX: 22.32 KG/M2 | HEIGHT: 68 IN | RESPIRATION RATE: 18 BRPM | HEART RATE: 56 BPM | SYSTOLIC BLOOD PRESSURE: 136 MMHG

## 2021-06-23 DIAGNOSIS — G61.81 CIDP (CHRONIC INFLAMMATORY DEMYELINATING POLYNEUROPATHY) (HCC): ICD-10-CM

## 2021-06-23 PROCEDURE — 96366 THER/PROPH/DIAG IV INF ADDON: CPT

## 2021-06-23 PROCEDURE — 96365 THER/PROPH/DIAG IV INF INIT: CPT

## 2021-06-23 PROCEDURE — 700111 HCHG RX REV CODE 636 W/ 250 OVERRIDE (IP): Mod: JG | Performed by: STUDENT IN AN ORGANIZED HEALTH CARE EDUCATION/TRAINING PROGRAM

## 2021-06-23 RX ORDER — ACETAMINOPHEN 325 MG/1
650 TABLET ORAL ONCE
Status: DISCONTINUED | OUTPATIENT
Start: 2021-06-23 | End: 2021-06-23 | Stop reason: HOSPADM

## 2021-06-23 RX ORDER — METHYLPREDNISOLONE SODIUM SUCCINATE 125 MG/2ML
125 INJECTION, POWDER, LYOPHILIZED, FOR SOLUTION INTRAMUSCULAR; INTRAVENOUS PRN
Status: CANCELLED | OUTPATIENT
Start: 2021-07-21

## 2021-06-23 RX ORDER — DIPHENHYDRAMINE HYDROCHLORIDE 50 MG/ML
50 INJECTION INTRAMUSCULAR; INTRAVENOUS PRN
Status: CANCELLED | OUTPATIENT
Start: 2021-07-21

## 2021-06-23 RX ORDER — 0.9 % SODIUM CHLORIDE 0.9 %
10 VIAL (ML) INJECTION PRN
Status: CANCELLED | OUTPATIENT
Start: 2021-07-21

## 2021-06-23 RX ORDER — 0.9 % SODIUM CHLORIDE 0.9 %
3 VIAL (ML) INJECTION PRN
Status: CANCELLED | OUTPATIENT
Start: 2021-07-21

## 2021-06-23 RX ORDER — 0.9 % SODIUM CHLORIDE 0.9 %
VIAL (ML) INJECTION PRN
Status: CANCELLED | OUTPATIENT
Start: 2021-07-21

## 2021-06-23 RX ORDER — ACETAMINOPHEN 325 MG/1
650 TABLET ORAL ONCE
Status: CANCELLED | OUTPATIENT
Start: 2021-07-21 | End: 2021-07-21

## 2021-06-23 RX ORDER — EPINEPHRINE 1 MG/ML(1)
0.5 AMPUL (ML) INJECTION PRN
Status: CANCELLED | OUTPATIENT
Start: 2021-07-21

## 2021-06-23 RX ORDER — HEPARIN SODIUM (PORCINE) LOCK FLUSH IV SOLN 100 UNIT/ML 100 UNIT/ML
500 SOLUTION INTRAVENOUS PRN
Status: CANCELLED | OUTPATIENT
Start: 2021-07-21

## 2021-06-23 RX ORDER — DIPHENHYDRAMINE HCL 25 MG
50 TABLET ORAL ONCE
Status: DISCONTINUED | OUTPATIENT
Start: 2021-06-23 | End: 2021-06-23 | Stop reason: HOSPADM

## 2021-06-23 RX ORDER — DIPHENHYDRAMINE HCL 25 MG
50 TABLET ORAL ONCE
Status: CANCELLED | OUTPATIENT
Start: 2021-07-21 | End: 2021-07-21

## 2021-06-23 RX ORDER — SODIUM CHLORIDE 9 MG/ML
INJECTION, SOLUTION INTRAVENOUS CONTINUOUS
Status: CANCELLED | OUTPATIENT
Start: 2021-07-21

## 2021-06-23 RX ADMIN — IMMUNE GLOBULIN INFUSION (HUMAN) 65 G: 100 INJECTION, SOLUTION INTRAVENOUS; SUBCUTANEOUS at 11:07

## 2021-06-23 ASSESSMENT — FIBROSIS 4 INDEX: FIB4 SCORE: 1.14

## 2021-06-23 NOTE — PROGRESS NOTES
Pt presents to [facility] for IVIG infusion. PIV established in L forearm; brisk blood return noted and pt tolerated well. IVIG infused per protocol with titration and no s/s of adverse reactions. PIV flushed and brisk blood return observed and then removed; gauze/coband dressing applied. Next appointment confirmed and education provided. Pt discharged to self care with all personal belongings and in NAD.

## 2021-07-16 ENCOUNTER — OFFICE VISIT (OUTPATIENT)
Dept: NEUROLOGY | Facility: MEDICAL CENTER | Age: 76
End: 2021-07-16
Attending: STUDENT IN AN ORGANIZED HEALTH CARE EDUCATION/TRAINING PROGRAM
Payer: MEDICARE

## 2021-07-16 VITALS
WEIGHT: 147.05 LBS | HEART RATE: 79 BPM | HEIGHT: 67 IN | SYSTOLIC BLOOD PRESSURE: 118 MMHG | RESPIRATION RATE: 18 BRPM | DIASTOLIC BLOOD PRESSURE: 78 MMHG | TEMPERATURE: 97.8 F | BODY MASS INDEX: 23.08 KG/M2 | OXYGEN SATURATION: 98 %

## 2021-07-16 DIAGNOSIS — G61.81 CIDP (CHRONIC INFLAMMATORY DEMYELINATING POLYNEUROPATHY) (HCC): Primary | ICD-10-CM

## 2021-07-16 DIAGNOSIS — Z00.00 HEALTHCARE MAINTENANCE: ICD-10-CM

## 2021-07-16 PROCEDURE — 99212 OFFICE O/P EST SF 10 MIN: CPT | Performed by: STUDENT IN AN ORGANIZED HEALTH CARE EDUCATION/TRAINING PROGRAM

## 2021-07-16 PROCEDURE — 99214 OFFICE O/P EST MOD 30 MIN: CPT | Performed by: STUDENT IN AN ORGANIZED HEALTH CARE EDUCATION/TRAINING PROGRAM

## 2021-07-16 RX ORDER — DIPHENHYDRAMINE HCL 25 MG
50 TABLET ORAL ONCE
Status: CANCELLED | OUTPATIENT
Start: 2021-07-21 | End: 2021-07-21

## 2021-07-16 RX ORDER — SODIUM CHLORIDE 9 MG/ML
INJECTION, SOLUTION INTRAVENOUS CONTINUOUS
Status: CANCELLED | OUTPATIENT
Start: 2021-07-21

## 2021-07-16 RX ORDER — EPINEPHRINE 1 MG/ML(1)
0.5 AMPUL (ML) INJECTION PRN
Status: CANCELLED | OUTPATIENT
Start: 2021-07-21

## 2021-07-16 RX ORDER — 0.9 % SODIUM CHLORIDE 0.9 %
10 VIAL (ML) INJECTION PRN
Status: CANCELLED | OUTPATIENT
Start: 2021-07-21

## 2021-07-16 RX ORDER — 0.9 % SODIUM CHLORIDE 0.9 %
VIAL (ML) INJECTION PRN
Status: CANCELLED | OUTPATIENT
Start: 2021-07-21

## 2021-07-16 RX ORDER — DIPHENHYDRAMINE HYDROCHLORIDE 50 MG/ML
50 INJECTION INTRAMUSCULAR; INTRAVENOUS PRN
Status: CANCELLED | OUTPATIENT
Start: 2021-07-21

## 2021-07-16 RX ORDER — METHYLPREDNISOLONE SODIUM SUCCINATE 125 MG/2ML
125 INJECTION, POWDER, LYOPHILIZED, FOR SOLUTION INTRAMUSCULAR; INTRAVENOUS PRN
Status: CANCELLED | OUTPATIENT
Start: 2021-07-21

## 2021-07-16 RX ORDER — ACETAMINOPHEN 325 MG/1
650 TABLET ORAL ONCE
Status: CANCELLED | OUTPATIENT
Start: 2021-07-21 | End: 2021-07-21

## 2021-07-16 RX ORDER — HEPARIN SODIUM (PORCINE) LOCK FLUSH IV SOLN 100 UNIT/ML 100 UNIT/ML
500 SOLUTION INTRAVENOUS PRN
Status: CANCELLED | OUTPATIENT
Start: 2021-07-21

## 2021-07-16 RX ORDER — 0.9 % SODIUM CHLORIDE 0.9 %
3 VIAL (ML) INJECTION PRN
Status: CANCELLED | OUTPATIENT
Start: 2021-07-21

## 2021-07-16 ASSESSMENT — FIBROSIS 4 INDEX: FIB4 SCORE: 1.14

## 2021-07-16 NOTE — PROGRESS NOTES
GENERAL NEUROLOGY FOLLOW-UP  CHIEF COMPLAINT(S): CIDP    History of present illness:  Lei Isaacs 75 y.o. male presents today for polyneuropathy.  His clinical course, electrodiagnostic testing, and lumbar puncture is suggestive of CIDP.  He reports occasional bouts of exacerbation of numbness particularly in his legs and feet.  He currently feels like there is been progression of his symptoms since he was last seen in our clinic several months ago.  Specifically he notes numbness and dysesthesias in the bottom of his feet and the back of his legs.  He feels like his knees buckle.  He has not fallen.  No issues with eating/chewing/swallowing.  No shortness of breath.  No fever or chills.  No double vision.        Patient's PMH, PSH, FH, and SH were reviewed.    Medications and allergies were reviewed.    INTERVAL HISTORY:  Getting IVIG maintenance, had 3 maintenance doses    Overall, he remains clinically stable.    Labs again reviewed. Notable for past history of elevated A1c in the prediabetes range. +GD1a antibodies. Elevated protein in CSF. 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       Frandy Cruz MD   Medical Director, Epilepsy and Neurodiagnostics.   Clinical  of Neurology [facility].   Diplomate in Neurology, Epilepsy, and Electrodiagnostic Medicine.         Past medical history:   Past Medical History:   Diagnosis Date   • Arrhythmia     palpitations, increased heart beat on occasion       Past surgical  history:   Past Surgical History:   Procedure Laterality Date   • CARPAL TUNNEL RELEASE  10/25/2013    Performed by Lauro Rossi M.D. at SURGERY SAME DAY [facility] ORS   • NERVE ULNAR TRANSFER  10/25/2013    Performed by Lauro Rossi M.D. at SURGERY SAME DAY [facility]S   • EPICONDYLAR STRIPPING  10/25/2013    Performed by Lauro Rossi M.D. at SURGERY SAME DAY [facility]   • CARPAL TUNNEL RELEASE      right       Family history:   History reviewed. No pertinent family history.    Social history:     Current medications:   Current Outpatient Medications   Medication   • Flaxseed, Linseed, (FLAXSEED OIL PO)   • COVID-19 mRNA Vaccine, Pfizer, (COVID-19 VACCINE) 30 MCG/0.3ML Suspension injection   • VITAMIN K PO   • Cyanocobalamin (B-12 PO)   • vitamin D3, cholecalciferol, 400 UNIT Tab tablet   • KRILL OIL PO   • aspirin EC (ECOTRIN) 81 MG Tablet Delayed Response   • Garlic TABS     No current facility-administered medications for this visit.       Medication Allergy:  Allergies   Allergen Reactions   • Flu Virus Vaccine Itching and Runny Nose   • Seasonal          Review of systems:   Pertinent positives and negatives are as outlined above      Physical examination:   Vitals:    07/16/21 0859   BP: 118/78   Pulse: 79   Resp: 18   Temp: 36.6 °C (97.8 °F)   SpO2: 98%       General: Patient in no acute distress, pleasant and cooperative.  HEENT: Normocephalic, no signs of acute trauma.   Neck: appears supple, here is normal range of motion. No tenderness on exam.   Chest: clear to auscultation. Symmetrical chest rise with inhalation. No cough.   CV: RRR, no murmurs.   Skin: no signs of acute rashes or trauma.   Musculoskeletal: joints exhibit full range of motion. There are no signs of joint or muscle swelling.   Psychiatric: pertinent positives as discussed above    NEUROLOGICAL EXAM:   Mental status:  orientation: Awake, alert and oriented to self, month, year, situation.  Attention:  Intact  Visio-spatial testing: NT  Frontal release signs: Absent  Speech and language: speech is clear and fluent. The patient is able to name, repeat and comprehend. No impairment in fluency. No word substitions or paraphasic errors  Memory: There is intact recollection of recent and remote events.   Cranial nerve exam:   I: smell Not tested   II: visual acuity  OS: NT   OD: NT   II: visual fields Full to confrontation  Visual neglect: absent   II: pupils Equal, round, reactive to light   III,VII: ptosis None   III,IV,VI: extraocular muscles  Full ROM   V: mastication Normal   V: facial light touch sensation  Normal   V,VII: corneal reflex  Not tested   VII: facial muscle function - upper  Normal   VII: facial muscle function - lower Normal   VIII: hearing Not tested   IX: soft palate elevation  Normal   IX,X: gag reflex Not tested   XI: trapezius strength  5/5   XI: sternocleidomastoid strength 5/5   XI: neck flexion strength  5/5         Motor exam:   • He has bilateral atrophy of the thenar and hyperthenar eminences. 4/5 finger flexor and extenor eakness on R, 4+ on left. 4/5 thumb extensor and flexor weakness on R, 4+ on L. 4+ right bicep. 4+/5  Right foot dorsiflexor weakness. Otherwise 5 out of 5 throughout in the lower extremity  • Tone is normal.  • No abnormal movements were seen on exam.   • No muscle fasciculation  • Diffuse atrophy in the distal hands on the dorsum and palmar regions  •   Sensory exam mild to moderate sensory loss in the hands and feet to vibration  Deep tendon reflexes:  Absent b/l AJ, . 2+ patella, trace  In bi, BR, tri.Equivocal babinski bilaterally. .   Coordination: shows a normal finger-nose-finger. Normal rapidly alternating movements. Heel-knee-shin movements smooth and coordinated bilaterally.   Gait:   • The patient was able to get up from seated position on first attempt without requiring assistance.   • Found to be steady when walking.   • Has difficulty with toe walking and  heel walking particularly on the right.  • Movements were fluid with normal arm swing.   • The patient was able to turn without difficulties or tendency to fall.           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 Patient weakness has mildly progressed since last visit as reflected by greater R dital weakness and now R bicep weakness. 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 Continue IVIG. Consider Rituximab as laternative.      Visit Diagnoses     ICD-10-CM   1. CIDP (chronic inflammatory demyelinating polyneuropathy) ([facility])  G61.81   2. Healthcare maintenance  Z00.00        Starting rituxumab. Treatment plan signed      FOLLOW-UP:   Fu 3 months            BILLING DOCUMENTATION:       Counseling:  I spent a total of 35 minutes of face-to-face time in this visit. Over 50% of the time of the visit today was spent on counseling and or coordination of care wtih the patient and/or family, as above in assessment in plan.       Fabrizio Landaverde MD  Epilepsy and General Neurology  Department of Neurology  Clinical  of Neurology [facility].

## 2021-07-21 ENCOUNTER — OUTPATIENT INFUSION SERVICES (OUTPATIENT)
Dept: ONCOLOGY | Facility: MEDICAL CENTER | Age: 76
End: 2021-07-21
Attending: STUDENT IN AN ORGANIZED HEALTH CARE EDUCATION/TRAINING PROGRAM
Payer: MEDICARE

## 2021-07-21 VITALS
RESPIRATION RATE: 18 BRPM | BODY MASS INDEX: 22.39 KG/M2 | OXYGEN SATURATION: 98 % | HEIGHT: 68 IN | SYSTOLIC BLOOD PRESSURE: 131 MMHG | WEIGHT: 147.71 LBS | DIASTOLIC BLOOD PRESSURE: 59 MMHG | TEMPERATURE: 98 F | HEART RATE: 58 BPM

## 2021-07-21 DIAGNOSIS — G61.81 CIDP (CHRONIC INFLAMMATORY DEMYELINATING POLYNEUROPATHY) (HCC): ICD-10-CM

## 2021-07-21 PROCEDURE — 96365 THER/PROPH/DIAG IV INF INIT: CPT

## 2021-07-21 PROCEDURE — 96366 THER/PROPH/DIAG IV INF ADDON: CPT

## 2021-07-21 PROCEDURE — 700111 HCHG RX REV CODE 636 W/ 250 OVERRIDE (IP): Mod: JG | Performed by: STUDENT IN AN ORGANIZED HEALTH CARE EDUCATION/TRAINING PROGRAM

## 2021-07-21 RX ORDER — SODIUM CHLORIDE 9 MG/ML
INJECTION, SOLUTION INTRAVENOUS CONTINUOUS
Status: CANCELLED | OUTPATIENT
Start: 2021-08-18

## 2021-07-21 RX ORDER — 0.9 % SODIUM CHLORIDE 0.9 %
VIAL (ML) INJECTION PRN
Status: CANCELLED | OUTPATIENT
Start: 2021-08-18

## 2021-07-21 RX ORDER — 0.9 % SODIUM CHLORIDE 0.9 %
3 VIAL (ML) INJECTION PRN
Status: CANCELLED | OUTPATIENT
Start: 2021-08-18

## 2021-07-21 RX ORDER — EPINEPHRINE 1 MG/ML(1)
0.5 AMPUL (ML) INJECTION PRN
Status: CANCELLED | OUTPATIENT
Start: 2021-08-18

## 2021-07-21 RX ORDER — METHYLPREDNISOLONE SODIUM SUCCINATE 125 MG/2ML
125 INJECTION, POWDER, LYOPHILIZED, FOR SOLUTION INTRAMUSCULAR; INTRAVENOUS PRN
Status: CANCELLED | OUTPATIENT
Start: 2021-08-18

## 2021-07-21 RX ORDER — DIPHENHYDRAMINE HYDROCHLORIDE 50 MG/ML
50 INJECTION INTRAMUSCULAR; INTRAVENOUS PRN
Status: CANCELLED | OUTPATIENT
Start: 2021-08-18

## 2021-07-21 RX ORDER — ACETAMINOPHEN 325 MG/1
650 TABLET ORAL ONCE
Status: CANCELLED | OUTPATIENT
Start: 2021-08-18 | End: 2021-08-18

## 2021-07-21 RX ORDER — DIPHENHYDRAMINE HCL 25 MG
50 TABLET ORAL ONCE
Status: CANCELLED | OUTPATIENT
Start: 2021-08-18 | End: 2021-08-18

## 2021-07-21 RX ORDER — 0.9 % SODIUM CHLORIDE 0.9 %
10 VIAL (ML) INJECTION PRN
Status: CANCELLED | OUTPATIENT
Start: 2021-08-18

## 2021-07-21 RX ORDER — DIPHENHYDRAMINE HCL 25 MG
50 TABLET ORAL ONCE
Status: DISCONTINUED | OUTPATIENT
Start: 2021-07-21 | End: 2021-07-21 | Stop reason: HOSPADM

## 2021-07-21 RX ORDER — ACETAMINOPHEN 325 MG/1
650 TABLET ORAL ONCE
Status: DISCONTINUED | OUTPATIENT
Start: 2021-07-21 | End: 2021-07-21 | Stop reason: HOSPADM

## 2021-07-21 RX ORDER — HEPARIN SODIUM (PORCINE) LOCK FLUSH IV SOLN 100 UNIT/ML 100 UNIT/ML
500 SOLUTION INTRAVENOUS PRN
Status: CANCELLED | OUTPATIENT
Start: 2021-08-18

## 2021-07-21 RX ADMIN — IMMUNE GLOBULIN INFUSION (HUMAN) 70 G: 100 INJECTION, SOLUTION INTRAVENOUS; SUBCUTANEOUS at 11:28

## 2021-07-21 ASSESSMENT — FIBROSIS 4 INDEX: FIB4 SCORE: 1.14

## 2021-07-21 NOTE — PROGRESS NOTES
Pt presents to [facility] for IVIG infusion. PIV established in L forearm; brisk blood return noted and pt tolerated well. IVIG infused per protocol with titration and no s/s of adverse reactions. PIV flushed and brisk blood return observed and then removed; gauze/coband dressing applied. Next appointment confirmed and education provided. Pt discharged to self care with all personal belongings and in NAD.

## 2021-08-18 ENCOUNTER — OUTPATIENT INFUSION SERVICES (OUTPATIENT)
Dept: ONCOLOGY | Facility: MEDICAL CENTER | Age: 76
End: 2021-08-18
Attending: STUDENT IN AN ORGANIZED HEALTH CARE EDUCATION/TRAINING PROGRAM
Payer: MEDICARE

## 2021-08-18 VITALS
HEIGHT: 68 IN | WEIGHT: 147.05 LBS | RESPIRATION RATE: 18 BRPM | OXYGEN SATURATION: 99 % | DIASTOLIC BLOOD PRESSURE: 55 MMHG | TEMPERATURE: 97.3 F | HEART RATE: 55 BPM | BODY MASS INDEX: 22.29 KG/M2 | SYSTOLIC BLOOD PRESSURE: 142 MMHG

## 2021-08-18 DIAGNOSIS — G61.81 CIDP (CHRONIC INFLAMMATORY DEMYELINATING POLYNEUROPATHY) (HCC): ICD-10-CM

## 2021-08-18 PROCEDURE — 96366 THER/PROPH/DIAG IV INF ADDON: CPT

## 2021-08-18 PROCEDURE — 96365 THER/PROPH/DIAG IV INF INIT: CPT

## 2021-08-18 PROCEDURE — 700111 HCHG RX REV CODE 636 W/ 250 OVERRIDE (IP): Mod: JG | Performed by: STUDENT IN AN ORGANIZED HEALTH CARE EDUCATION/TRAINING PROGRAM

## 2021-08-18 PROCEDURE — 96375 TX/PRO/DX INJ NEW DRUG ADDON: CPT

## 2021-08-18 PROCEDURE — 700102 HCHG RX REV CODE 250 W/ 637 OVERRIDE(OP): Performed by: STUDENT IN AN ORGANIZED HEALTH CARE EDUCATION/TRAINING PROGRAM

## 2021-08-18 PROCEDURE — A9270 NON-COVERED ITEM OR SERVICE: HCPCS | Performed by: STUDENT IN AN ORGANIZED HEALTH CARE EDUCATION/TRAINING PROGRAM

## 2021-08-18 RX ORDER — 0.9 % SODIUM CHLORIDE 0.9 %
VIAL (ML) INJECTION PRN
Status: CANCELLED | OUTPATIENT
Start: 2021-09-15

## 2021-08-18 RX ORDER — DIPHENHYDRAMINE HCL 25 MG
50 TABLET ORAL ONCE
Status: CANCELLED | OUTPATIENT
Start: 2021-09-15 | End: 2021-09-15

## 2021-08-18 RX ORDER — HEPARIN SODIUM (PORCINE) LOCK FLUSH IV SOLN 100 UNIT/ML 100 UNIT/ML
500 SOLUTION INTRAVENOUS PRN
Status: CANCELLED | OUTPATIENT
Start: 2021-09-15

## 2021-08-18 RX ORDER — DIPHENHYDRAMINE HCL 25 MG
50 TABLET ORAL ONCE
Status: COMPLETED | OUTPATIENT
Start: 2021-08-18 | End: 2021-08-18

## 2021-08-18 RX ORDER — 0.9 % SODIUM CHLORIDE 0.9 %
10 VIAL (ML) INJECTION PRN
Status: CANCELLED | OUTPATIENT
Start: 2021-09-15

## 2021-08-18 RX ORDER — ACETAMINOPHEN 325 MG/1
650 TABLET ORAL ONCE
Status: CANCELLED | OUTPATIENT
Start: 2021-09-15 | End: 2021-09-15

## 2021-08-18 RX ORDER — EPINEPHRINE 1 MG/ML(1)
0.5 AMPUL (ML) INJECTION PRN
Status: CANCELLED | OUTPATIENT
Start: 2021-09-15

## 2021-08-18 RX ORDER — ACETAMINOPHEN 325 MG/1
650 TABLET ORAL ONCE
Status: COMPLETED | OUTPATIENT
Start: 2021-08-18 | End: 2021-08-18

## 2021-08-18 RX ORDER — SODIUM CHLORIDE 9 MG/ML
INJECTION, SOLUTION INTRAVENOUS CONTINUOUS
Status: CANCELLED | OUTPATIENT
Start: 2021-09-15

## 2021-08-18 RX ORDER — METHYLPREDNISOLONE SODIUM SUCCINATE 125 MG/2ML
125 INJECTION, POWDER, LYOPHILIZED, FOR SOLUTION INTRAMUSCULAR; INTRAVENOUS PRN
Status: CANCELLED | OUTPATIENT
Start: 2021-09-15

## 2021-08-18 RX ORDER — DIPHENHYDRAMINE HYDROCHLORIDE 50 MG/ML
50 INJECTION INTRAMUSCULAR; INTRAVENOUS PRN
Status: CANCELLED | OUTPATIENT
Start: 2021-09-15

## 2021-08-18 RX ORDER — 0.9 % SODIUM CHLORIDE 0.9 %
3 VIAL (ML) INJECTION PRN
Status: CANCELLED | OUTPATIENT
Start: 2021-09-15

## 2021-08-18 RX ADMIN — HYDROCORTISONE SODIUM SUCCINATE 100 MG: 100 INJECTION, POWDER, FOR SOLUTION INTRAMUSCULAR; INTRAVENOUS at 09:55

## 2021-08-18 RX ADMIN — ACETAMINOPHEN 650 MG: 325 TABLET ORAL at 09:41

## 2021-08-18 RX ADMIN — DIPHENHYDRAMINE HYDROCHLORIDE 50 MG: 25 TABLET ORAL at 09:41

## 2021-08-18 RX ADMIN — IMMUNE GLOBULIN INFUSION (HUMAN) 70 G: 100 INJECTION, SOLUTION INTRAVENOUS; SUBCUTANEOUS at 10:00

## 2021-08-18 ASSESSMENT — FIBROSIS 4 INDEX: FIB4 SCORE: 1.14

## 2021-08-18 NOTE — PROGRESS NOTES
[facility] for [facility]'s for CIDP (chronic inflammatory demyelinating polyneuropathy). Pt denied having any new or acute complaints today, reports tolerating past treatments well. PIV started, had + blood return flushed briskly. Pt given IV Ig's as prescribed, tolerated well, denied having any complaints during or after infusion. Max rate of 140 cc/hr due to age. PIV discontinued, bleeding controlled with gauze and coban. Discharge home to self care in [facility]. Per patient no further appointments needed at this time.

## 2021-10-27 ENCOUNTER — APPOINTMENT (OUTPATIENT)
Dept: NEUROLOGY | Facility: MEDICAL CENTER | Age: 76
End: 2021-10-27
Attending: STUDENT IN AN ORGANIZED HEALTH CARE EDUCATION/TRAINING PROGRAM
Payer: MEDICARE

## 2022-11-04 ENCOUNTER — PATIENT MESSAGE (OUTPATIENT)
Dept: HEALTH INFORMATION MANAGEMENT | Facility: OTHER | Age: 77
End: 2022-11-04

## 2023-11-29 ENCOUNTER — PATIENT MESSAGE (OUTPATIENT)
Dept: HEALTH INFORMATION MANAGEMENT | Facility: OTHER | Age: 78
End: 2023-11-29